# Patient Record
Sex: MALE | Race: WHITE | NOT HISPANIC OR LATINO | Employment: OTHER | URBAN - METROPOLITAN AREA
[De-identification: names, ages, dates, MRNs, and addresses within clinical notes are randomized per-mention and may not be internally consistent; named-entity substitution may affect disease eponyms.]

---

## 2023-03-07 LAB
ALANINE AMINOTRANSFERASE (SGPT) (U/L) IN SER/PLAS: 49 U/L (ref 10–52)
ALBUMIN (G/DL) IN SER/PLAS: 4.7 G/DL (ref 3.4–5)
ALKALINE PHOSPHATASE (U/L) IN SER/PLAS: 64 U/L (ref 33–136)
ANION GAP IN SER/PLAS: 11 MMOL/L (ref 10–20)
ASPARTATE AMINOTRANSFERASE (SGOT) (U/L) IN SER/PLAS: 50 U/L (ref 9–39)
BASOPHILS (10*3/UL) IN BLOOD BY AUTOMATED COUNT: 0.04 X10E9/L (ref 0–0.1)
BASOPHILS/100 LEUKOCYTES IN BLOOD BY AUTOMATED COUNT: 0.6 % (ref 0–2)
BILIRUBIN TOTAL (MG/DL) IN SER/PLAS: 0.9 MG/DL (ref 0–1.2)
CALCIUM (MG/DL) IN SER/PLAS: 9.4 MG/DL (ref 8.6–10.3)
CARBON DIOXIDE, TOTAL (MMOL/L) IN SER/PLAS: 30 MMOL/L (ref 21–32)
CHLORIDE (MMOL/L) IN SER/PLAS: 104 MMOL/L (ref 98–107)
CHOLESTEROL (MG/DL) IN SER/PLAS: 182 MG/DL (ref 0–199)
CHOLESTEROL IN HDL (MG/DL) IN SER/PLAS: 54.9 MG/DL
CHOLESTEROL/HDL RATIO: 3.3
COBALAMIN (VITAMIN B12) (PG/ML) IN SER/PLAS: 289 PG/ML (ref 211–911)
CREATININE (MG/DL) IN SER/PLAS: 1.01 MG/DL (ref 0.5–1.3)
EOSINOPHILS (10*3/UL) IN BLOOD BY AUTOMATED COUNT: 0.1 X10E9/L (ref 0–0.7)
EOSINOPHILS/100 LEUKOCYTES IN BLOOD BY AUTOMATED COUNT: 1.5 % (ref 0–6)
ERYTHROCYTE DISTRIBUTION WIDTH (RATIO) BY AUTOMATED COUNT: 12.7 % (ref 11.5–14.5)
ERYTHROCYTE MEAN CORPUSCULAR HEMOGLOBIN CONCENTRATION (G/DL) BY AUTOMATED: 34.8 G/DL (ref 32–36)
ERYTHROCYTE MEAN CORPUSCULAR VOLUME (FL) BY AUTOMATED COUNT: 100 FL (ref 80–100)
ERYTHROCYTES (10*6/UL) IN BLOOD BY AUTOMATED COUNT: 4.31 X10E12/L (ref 4.5–5.9)
FOLATE (NG/ML) IN SER/PLAS: 9.4 NG/ML
GFR MALE: 83 ML/MIN/1.73M2
GLUCOSE (MG/DL) IN SER/PLAS: 100 MG/DL (ref 74–99)
HEMATOCRIT (%) IN BLOOD BY AUTOMATED COUNT: 43.1 % (ref 41–52)
HEMOGLOBIN (G/DL) IN BLOOD: 15 G/DL (ref 13.5–17.5)
IMMATURE GRANULOCYTES/100 LEUKOCYTES IN BLOOD BY AUTOMATED COUNT: 0.6 % (ref 0–0.9)
LDL: 110 MG/DL (ref 0–99)
LEUKOCYTES (10*3/UL) IN BLOOD BY AUTOMATED COUNT: 6.6 X10E9/L (ref 4.4–11.3)
LYMPHOCYTES (10*3/UL) IN BLOOD BY AUTOMATED COUNT: 1.91 X10E9/L (ref 1.2–4.8)
LYMPHOCYTES/100 LEUKOCYTES IN BLOOD BY AUTOMATED COUNT: 29 % (ref 13–44)
MONOCYTES (10*3/UL) IN BLOOD BY AUTOMATED COUNT: 0.73 X10E9/L (ref 0.1–1)
MONOCYTES/100 LEUKOCYTES IN BLOOD BY AUTOMATED COUNT: 11.1 % (ref 2–10)
NEUTROPHILS (10*3/UL) IN BLOOD BY AUTOMATED COUNT: 3.76 X10E9/L (ref 1.2–7.7)
NEUTROPHILS/100 LEUKOCYTES IN BLOOD BY AUTOMATED COUNT: 57.2 % (ref 40–80)
NRBC (PER 100 WBCS) BY AUTOMATED COUNT: 0 /100 WBC (ref 0–0)
PLATELETS (10*3/UL) IN BLOOD AUTOMATED COUNT: 137 X10E9/L (ref 150–450)
POTASSIUM (MMOL/L) IN SER/PLAS: 4.5 MMOL/L (ref 3.5–5.3)
PROTEIN TOTAL: 6.9 G/DL (ref 6.4–8.2)
SODIUM (MMOL/L) IN SER/PLAS: 140 MMOL/L (ref 136–145)
TRIGLYCERIDE (MG/DL) IN SER/PLAS: 87 MG/DL (ref 0–149)
UREA NITROGEN (MG/DL) IN SER/PLAS: 20 MG/DL (ref 6–23)
VLDL: 17 MG/DL (ref 0–40)

## 2023-07-17 ENCOUNTER — HOSPITAL ENCOUNTER (OUTPATIENT)
Dept: DATA CONVERSION | Facility: HOSPITAL | Age: 63
End: 2023-07-17
Attending: STUDENT IN AN ORGANIZED HEALTH CARE EDUCATION/TRAINING PROGRAM | Admitting: STUDENT IN AN ORGANIZED HEALTH CARE EDUCATION/TRAINING PROGRAM
Payer: MEDICARE

## 2023-07-17 DIAGNOSIS — J90 PLEURAL EFFUSION, NOT ELSEWHERE CLASSIFIED: ICD-10-CM

## 2023-08-14 ENCOUNTER — HOSPITAL ENCOUNTER (OUTPATIENT)
Dept: DATA CONVERSION | Facility: HOSPITAL | Age: 63
End: 2023-08-14
Attending: RADIOLOGY
Payer: MEDICARE

## 2023-08-14 DIAGNOSIS — J90 PLEURAL EFFUSION, NOT ELSEWHERE CLASSIFIED: ICD-10-CM

## 2023-08-14 LAB
BASOPHILS/100 LEUKOCYTES IN BODY FLUID BY MANUAL COUNT: 0 %
BLASTS IN FLUID: 0 %
CELLS COUNTED TOTAL (#) IN BODY FLUID: 100
CLARITY FLUID: NORMAL
CLARITY FLUID: NORMAL
COLOR OF BODY FLUID: NORMAL
COLOR OF BODY FLUID: NORMAL
EOSINOPHILS/100 LEUKOCYTES IN BODY FLUID BY MANUAL COUNT: 1 %
ERYTHROCYTES (/UL) IN BODY FLUID: NORMAL
ERYTHROCYTES (/UL) IN BODY FLUID: NORMAL /UL
GLUCOSE (MG/DL) IN BODY FLUID: 106 MG/DL
IMMATURE GRANULOCYTES IN FLUID: 0 %
LACTATE DEHYDROGENASE (U/L) IN BODY FLUID BY LAC->PYR: 83 U/L
LEUKOCYTES (/UL) IN BODY FLUID: 1126 /UL
LEUKOCYTES (/UL) IN BODY FLUID: NORMAL
LYMPHOCYTES/100 LEUKOCYTES IN BODY FLUID BY MAN CT: 70 %
MONOCYTES+MACROPHAGES/100 WBC IN BODY FLUID BY MAN CT: 2 %
NEUTROPHILS/100 LEUKOCYTES IN BODY FLUID BY MANUAL COUNT: 27 %
PLASMA CELLS IN FLUID: 0 %
PROTEIN (G/DL) IN BODY FLUID: 4.6 G/DL
UNCLASSIFIED CELLS IN FLUID: 0 %

## 2023-08-15 LAB
COMPLETE PATHOLOGY REPORT: NORMAL
CONVERTED CLINICAL DIAGNOSIS-HISTORY: NORMAL
CONVERTED DIAGNOSIS COMMENT: NORMAL
CONVERTED FINAL DIAGNOSIS: NORMAL
CONVERTED FINAL REPORT PDF LINK TO COPY AND PASTE: NORMAL
CONVERTED SPECIMEN DESCRIPTION: NORMAL

## 2023-08-17 LAB
GRAM STAIN: NORMAL
STERILE FLUID CULTURE/SMEAR: NORMAL

## 2023-09-29 VITALS — BODY MASS INDEX: 28.07 KG/M2 | WEIGHT: 185.19 LBS | HEIGHT: 68 IN

## 2023-09-29 VITALS — HEIGHT: 68 IN | BODY MASS INDEX: 28.07 KG/M2 | WEIGHT: 185.19 LBS

## 2023-11-06 ENCOUNTER — TELEPHONE (OUTPATIENT)
Dept: CARDIOLOGY | Facility: CLINIC | Age: 63
End: 2023-11-06
Payer: MEDICARE

## 2023-11-06 PROBLEM — E78.2 MIXED HYPERLIPIDEMIA: Status: ACTIVE | Noted: 2023-11-06

## 2023-11-06 PROBLEM — I42.9 CARDIOMYOPATHY (MULTI): Status: ACTIVE | Noted: 2023-11-06

## 2023-11-06 PROBLEM — R79.89 ELEVATED LFTS: Status: ACTIVE | Noted: 2023-11-06

## 2023-11-06 PROBLEM — I10 ESSENTIAL HYPERTENSION, BENIGN: Status: ACTIVE | Noted: 2023-11-06

## 2023-11-06 PROBLEM — D75.89 MACROCYTOSIS: Status: ACTIVE | Noted: 2023-11-06

## 2023-11-06 RX ORDER — LOSARTAN POTASSIUM 25 MG/1
50 TABLET ORAL DAILY
COMMUNITY
End: 2023-11-30 | Stop reason: DRUGHIGH

## 2023-11-06 RX ORDER — METOPROLOL SUCCINATE 50 MG/1
50 TABLET, EXTENDED RELEASE ORAL DAILY
COMMUNITY
End: 2023-11-30

## 2023-11-06 RX ORDER — ROSUVASTATIN CALCIUM 20 MG/1
20 TABLET, COATED ORAL
COMMUNITY
End: 2023-12-10 | Stop reason: SDUPTHER

## 2023-11-07 NOTE — TELEPHONE ENCOUNTER
Patient returned my call.  Patient indicated that the pulmonologist cleared patient to resume Xarelto approximately 9/21/2023.  Patient has been taking the medication since that time.    Patient advised that he picked a prescription up for a 90 day supply of Xarelto, 20 mg tablet, 1x per day, on 11/3/2023, and does not need medication refilled at this time.

## 2023-11-29 ENCOUNTER — TELEPHONE (OUTPATIENT)
Dept: CARDIOLOGY | Facility: CLINIC | Age: 63
End: 2023-11-29
Payer: MEDICARE

## 2023-11-29 DIAGNOSIS — E78.2 MIXED HYPERLIPIDEMIA: ICD-10-CM

## 2023-11-29 DIAGNOSIS — I48.91 UNSPECIFIED ATRIAL FIBRILLATION (MULTI): ICD-10-CM

## 2023-11-29 NOTE — TELEPHONE ENCOUNTER
Patient called requesting med refills of Metoprolol 50MG and rosuvastatin 20mg to [facility].     In review of chart, refills should be on file at the pharmacy.  I called pharmacy and confirmed.  Patient is aware.  He will contact pharmacy and request the refill.

## 2023-11-30 DIAGNOSIS — I10 ESSENTIAL (PRIMARY) HYPERTENSION: ICD-10-CM

## 2023-11-30 RX ORDER — METOPROLOL SUCCINATE 50 MG/1
50 TABLET, EXTENDED RELEASE ORAL DAILY
Qty: 90 TABLET | Refills: 0 | Status: SHIPPED | OUTPATIENT
Start: 2023-11-30 | End: 2024-02-27

## 2023-11-30 RX ORDER — LOSARTAN POTASSIUM 50 MG/1
50 TABLET ORAL DAILY
Qty: 90 TABLET | Refills: 0 | Status: SHIPPED | OUTPATIENT
Start: 2023-11-30 | End: 2024-01-31 | Stop reason: SDUPTHER

## 2023-12-01 RX ORDER — ROSUVASTATIN CALCIUM 20 MG/1
20 TABLET, COATED ORAL DAILY
Qty: 90 TABLET | Refills: 3 | OUTPATIENT
Start: 2023-12-01

## 2023-12-07 DIAGNOSIS — E78.2 MIXED HYPERLIPIDEMIA: Primary | ICD-10-CM

## 2023-12-10 RX ORDER — ROSUVASTATIN CALCIUM 20 MG/1
20 TABLET, COATED ORAL DAILY
Qty: 90 TABLET | Refills: 3 | Status: SHIPPED | OUTPATIENT
Start: 2023-12-10 | End: 2024-05-01 | Stop reason: SDUPTHER

## 2024-01-22 ENCOUNTER — APPOINTMENT (OUTPATIENT)
Dept: PRIMARY CARE | Facility: CLINIC | Age: 64
End: 2024-01-22
Payer: MEDICARE

## 2024-01-24 PROBLEM — R60.0 EDEMA LEG: Status: ACTIVE | Noted: 2024-01-24

## 2024-01-24 PROBLEM — S22.42XA CLOSED FRACTURE OF MULTIPLE RIBS OF LEFT SIDE: Status: ACTIVE | Noted: 2024-01-24

## 2024-01-24 PROBLEM — S20.212S: Status: ACTIVE | Noted: 2024-01-24

## 2024-01-24 PROBLEM — R79.89 TSH ELEVATION: Status: ACTIVE | Noted: 2024-01-24
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
## 2024-01-24 PROBLEM — J90 PLEURAL EFFUSION, LEFT: Status: ACTIVE | Noted: 2024-01-24

## 2024-01-24 PROBLEM — M25.512 CHRONIC LEFT SHOULDER PAIN: Status: ACTIVE | Noted: 2024-01-24

## 2024-01-24 PROBLEM — R06.02 MILD SHORTNESS OF BREATH: Status: ACTIVE | Noted: 2024-01-24

## 2024-01-24 PROBLEM — E66.3 OVERWEIGHT (BMI 25.0-29.9): Status: ACTIVE | Noted: 2024-01-24

## 2024-01-24 PROBLEM — G89.29 CHRONIC LEFT SHOULDER PAIN: Status: ACTIVE | Noted: 2024-01-24

## 2024-01-24 PROBLEM — I48.91 ATRIAL FIBRILLATION (MULTI): Status: ACTIVE | Noted: 2024-01-24

## 2024-01-24 RX ORDER — LORATADINE 10 MG/1
10 TABLET ORAL DAILY
COMMUNITY

## 2024-01-31 ENCOUNTER — OFFICE VISIT (OUTPATIENT)
Dept: PRIMARY CARE | Facility: CLINIC | Age: 64
End: 2024-01-31
Payer: MEDICARE

## 2024-01-31 VITALS
DIASTOLIC BLOOD PRESSURE: 77 MMHG | HEART RATE: 50 BPM | BODY MASS INDEX: 30.5 KG/M2 | SYSTOLIC BLOOD PRESSURE: 145 MMHG | HEIGHT: 66 IN | WEIGHT: 189.8 LBS | TEMPERATURE: 97.4 F

## 2024-01-31 DIAGNOSIS — I48.0 PAROXYSMAL ATRIAL FIBRILLATION (MULTI): ICD-10-CM

## 2024-01-31 DIAGNOSIS — E78.2 MIXED HYPERLIPIDEMIA: ICD-10-CM

## 2024-01-31 DIAGNOSIS — M70.61 TROCHANTERIC BURSITIS OF RIGHT HIP: ICD-10-CM

## 2024-01-31 DIAGNOSIS — Z12.5 ENCOUNTER FOR SCREENING FOR MALIGNANT NEOPLASM OF PROSTATE: ICD-10-CM

## 2024-01-31 DIAGNOSIS — H00.15 CHALAZION OF LEFT LOWER EYELID: ICD-10-CM

## 2024-01-31 DIAGNOSIS — I10 ESSENTIAL (PRIMARY) HYPERTENSION: Primary | ICD-10-CM

## 2024-01-31 PROCEDURE — 3077F SYST BP >= 140 MM HG: CPT | Performed by: FAMILY MEDICINE

## 2024-01-31 PROCEDURE — 1160F RVW MEDS BY RX/DR IN RCRD: CPT | Performed by: FAMILY MEDICINE

## 2024-01-31 PROCEDURE — 1159F MED LIST DOCD IN RCRD: CPT | Performed by: FAMILY MEDICINE

## 2024-01-31 PROCEDURE — 99214 OFFICE O/P EST MOD 30 MIN: CPT | Performed by: FAMILY MEDICINE

## 2024-01-31 PROCEDURE — 3008F BODY MASS INDEX DOCD: CPT | Performed by: FAMILY MEDICINE

## 2024-01-31 PROCEDURE — 3078F DIAST BP <80 MM HG: CPT | Performed by: FAMILY MEDICINE

## 2024-01-31 PROCEDURE — 1036F TOBACCO NON-USER: CPT | Performed by: FAMILY MEDICINE

## 2024-01-31 PROCEDURE — 1126F AMNT PAIN NOTED NONE PRSNT: CPT | Performed by: FAMILY MEDICINE

## 2024-01-31 RX ORDER — BACITRACIN ZINC AND POLYMYXIN B SULFATE 500; 10000 [USP'U]/G; [USP'U]/G
OINTMENT OPHTHALMIC EVERY 12 HOURS
Qty: 3.5 G | Refills: 0 | Status: SHIPPED | OUTPATIENT
Start: 2024-01-31 | End: 2024-02-10

## 2024-01-31 RX ORDER — RIVAROXABAN 20 MG/1
20 TABLET, FILM COATED ORAL DAILY
COMMUNITY
End: 2024-05-01 | Stop reason: SDUPTHER

## 2024-01-31 RX ORDER — LOSARTAN POTASSIUM 100 MG/1
100 TABLET ORAL DAILY
Qty: 90 TABLET | Refills: 1 | Status: SHIPPED | OUTPATIENT
Start: 2024-01-31 | End: 2024-02-07 | Stop reason: SDUPTHER

## 2024-01-31 RX ORDER — CHOLECALCIFEROL (VITAMIN D3) 25 MCG
1 TABLET,CHEWABLE ORAL DAILY
COMMUNITY
Start: 2022-08-25

## 2024-01-31 ASSESSMENT — PATIENT HEALTH QUESTIONNAIRE - PHQ9
SUM OF ALL RESPONSES TO PHQ9 QUESTIONS 1 AND 2: 0
1. LITTLE INTEREST OR PLEASURE IN DOING THINGS: NOT AT ALL
2. FEELING DOWN, DEPRESSED OR HOPELESS: NOT AT ALL

## 2024-01-31 NOTE — PATIENT INSTRUCTIONS

## 2024-01-31 NOTE — PROGRESS NOTES
Subjective   Patient ID: Norris Espinal is a 63 y.o. male who presents for Follow-up (Medication follow up).  HPI  HTN-tolerating losartan and metoprolol without adverse effect.  Not checking BP at home.  Denies headaches, lightheadedness, dizziness.  Afib-anticoagulated with Xarelto.  Rate control with beta-blocker.  Denies any symptoms of rapid heart rate.  No increased fatigue.  R hip pain-pain with direct pressure when laying on right side.  Subsequent R knee ache.  No injury.  Irritation left eye-red bump along left lower eyelid.  Present several weeks ago, then resolved.  Reoccurred earlier this week.  Irritated, tender.  No drainage.  Not associate with blurred or double vision.  Review of Systems  Per HPI  Objective   Physical Exam  Vitals reviewed.   Constitutional:       General: He is not in acute distress.     Appearance: Normal appearance.   HENT:      Head: Normocephalic and atraumatic.      Right Ear: Tympanic membrane and ear canal normal.      Left Ear: Tympanic membrane and ear canal normal.      Nose: Nose normal. No congestion or rhinorrhea.      Mouth/Throat:      Mouth: Mucous membranes are moist.      Pharynx: Oropharynx is clear.   Eyes:      Extraocular Movements: Extraocular movements intact.      Conjunctiva/sclera: Conjunctivae normal.      Pupils: Pupils are equal, round, and reactive to light.        Comments: Erythematous papular lesion along lateral aspect of left lower eyelid-appears consistent with chalazion.   Cardiovascular:      Rate and Rhythm: Normal rate. Rhythm irregular.      Pulses: Normal pulses.      Heart sounds: Normal heart sounds. No murmur heard.  Pulmonary:      Effort: Pulmonary effort is normal. No respiratory distress.      Breath sounds: Normal breath sounds.   Abdominal:      General: Abdomen is flat. Bowel sounds are normal.      Palpations: Abdomen is soft.      Tenderness: There is no abdominal tenderness.   Musculoskeletal:         General: Normal range of  motion.      Cervical back: Normal range of motion and neck supple.      Right hip: Tenderness present. Normal range of motion.      Right knee: Normal. No bony tenderness or crepitus. Normal range of motion.      Left knee: Normal.      Comments: Pain with palpation over the right trochanteric bursa   Lymphadenopathy:      Cervical: No cervical adenopathy.   Skin:     General: Skin is warm and dry.   Neurological:      General: No focal deficit present.      Mental Status: He is alert and oriented to person, place, and time.   Psychiatric:         Mood and Affect: Mood normal.         Thought Content: Thought content normal.         Assessment/Plan   Diagnoses and all orders for this visit:  Essential (primary) hypertension  BP not optimally controlled.  Increase losartan to 100 mg daily.  Lab work as ordered prior to follow-up  -     Albumin , Urine Random; Future  -     Comprehensive Metabolic Panel; Future  -     losartan (Cozaar) 100 mg tablet; Take 1 tablet (100 mg) by mouth once daily.  Paroxysmal atrial fibrillation (CMS/HCC)  Patient has very short neck and persistent atrial fibrillation.  Check for underlying sleep apnea as etiology or exacerbating factor to his atrial fibrillation.  -     CBC and Auto Differential; Future  -     Comprehensive Metabolic Panel; Future  -     TSH with reflex to Free T4 if abnormal; Future  -     Home sleep apnea test (HSAT); Future  Encounter for screening for malignant neoplasm of prostate  -     Prostate Specific Antigen; Future  Mixed hyperlipidemia  -     Comprehensive Metabolic Panel; Future  -     Lipid Panel; Future  -     TSH with reflex to Free T4 if abnormal; Future  Chalazion of left lower eyelid  Topical antibiotic ointment to left lower lid chalazion, recommend warm compresses, cleanse eye lashes and lid several times frequent baby shampoo.  If ongoing symptoms, will refer to ophthalmology  -     bacitracin-polymyxin B (Polysporin) ophthalmic ointment; Apply to  left eye every 12 hours for 10 days.  Trochanteric bursitis of right hip-discussed intrabursal corticosteroid injection.  Patient declined at present.  Use ice when painful.  Consider PT if ongoing symptoms  Other orders  -     Follow Up In Primary Care - Established; Future  -     Follow Up In Primary Care - Medicare Annual; Future           Court Diallo MD 01/31/24 1:38 PM Patient was identified as a fall risk. Risk prevention instructions provided.

## 2024-02-07 ENCOUNTER — TELEPHONE (OUTPATIENT)
Dept: PRIMARY CARE | Facility: CLINIC | Age: 64
End: 2024-02-07
Payer: MEDICARE

## 2024-02-07 DIAGNOSIS — I10 ESSENTIAL (PRIMARY) HYPERTENSION: ICD-10-CM

## 2024-02-07 RX ORDER — LOSARTAN POTASSIUM 100 MG/1
100 TABLET ORAL DAILY
Qty: 90 TABLET | Refills: 1 | Status: SHIPPED | OUTPATIENT
Start: 2024-02-07

## 2024-02-07 NOTE — TELEPHONE ENCOUNTER
Patient left voicemail stating that his Losartan needed to be sent to [facility] in [place].  States that it went to the mail order.  Please resend to retail pharmacy.

## 2024-02-21 ENCOUNTER — OFFICE VISIT (OUTPATIENT)
Dept: SURGERY | Facility: HOSPITAL | Age: 64
End: 2024-02-21
Payer: MEDICARE

## 2024-02-21 ENCOUNTER — HOSPITAL ENCOUNTER (OUTPATIENT)
Dept: RADIOLOGY | Facility: HOSPITAL | Age: 64
Discharge: HOME | End: 2024-02-21
Payer: MEDICARE

## 2024-02-21 VITALS
TEMPERATURE: 97.3 F | HEART RATE: 105 BPM | OXYGEN SATURATION: 96 % | DIASTOLIC BLOOD PRESSURE: 93 MMHG | BODY MASS INDEX: 30.87 KG/M2 | WEIGHT: 192.1 LBS | SYSTOLIC BLOOD PRESSURE: 142 MMHG | HEIGHT: 66 IN | RESPIRATION RATE: 16 BRPM

## 2024-02-21 DIAGNOSIS — J90 PLEURAL EFFUSION: ICD-10-CM

## 2024-02-21 DIAGNOSIS — J90 PLEURAL EFFUSION: Primary | ICD-10-CM

## 2024-02-21 PROCEDURE — 3077F SYST BP >= 140 MM HG: CPT | Performed by: THORACIC SURGERY (CARDIOTHORACIC VASCULAR SURGERY)

## 2024-02-21 PROCEDURE — 3008F BODY MASS INDEX DOCD: CPT | Performed by: THORACIC SURGERY (CARDIOTHORACIC VASCULAR SURGERY)

## 2024-02-21 PROCEDURE — 71046 X-RAY EXAM CHEST 2 VIEWS: CPT

## 2024-02-21 PROCEDURE — 1160F RVW MEDS BY RX/DR IN RCRD: CPT | Performed by: THORACIC SURGERY (CARDIOTHORACIC VASCULAR SURGERY)

## 2024-02-21 PROCEDURE — 1157F ADVNC CARE PLAN IN RCRD: CPT | Performed by: THORACIC SURGERY (CARDIOTHORACIC VASCULAR SURGERY)

## 2024-02-21 PROCEDURE — 1036F TOBACCO NON-USER: CPT | Performed by: THORACIC SURGERY (CARDIOTHORACIC VASCULAR SURGERY)

## 2024-02-21 PROCEDURE — 99213 OFFICE O/P EST LOW 20 MIN: CPT | Performed by: THORACIC SURGERY (CARDIOTHORACIC VASCULAR SURGERY)

## 2024-02-21 PROCEDURE — 3080F DIAST BP >= 90 MM HG: CPT | Performed by: THORACIC SURGERY (CARDIOTHORACIC VASCULAR SURGERY)

## 2024-02-21 PROCEDURE — 1159F MED LIST DOCD IN RCRD: CPT | Performed by: THORACIC SURGERY (CARDIOTHORACIC VASCULAR SURGERY)

## 2024-02-21 PROCEDURE — 1126F AMNT PAIN NOTED NONE PRSNT: CPT | Performed by: THORACIC SURGERY (CARDIOTHORACIC VASCULAR SURGERY)

## 2024-02-21 ASSESSMENT — COLUMBIA-SUICIDE SEVERITY RATING SCALE - C-SSRS
2. HAVE YOU ACTUALLY HAD ANY THOUGHTS OF KILLING YOURSELF?: NO
1. IN THE PAST MONTH, HAVE YOU WISHED YOU WERE DEAD OR WISHED YOU COULD GO TO SLEEP AND NOT WAKE UP?: NO
6. HAVE YOU EVER DONE ANYTHING, STARTED TO DO ANYTHING, OR PREPARED TO DO ANYTHING TO END YOUR LIFE?: NO

## 2024-02-21 ASSESSMENT — ENCOUNTER SYMPTOMS
DEPRESSION: 0
OCCASIONAL FEELINGS OF UNSTEADINESS: 0
LOSS OF SENSATION IN FEET: 0

## 2024-02-21 ASSESSMENT — PATIENT HEALTH QUESTIONNAIRE - PHQ9
1. LITTLE INTEREST OR PLEASURE IN DOING THINGS: NOT AT ALL
SUM OF ALL RESPONSES TO PHQ9 QUESTIONS 1 AND 2: 0
2. FEELING DOWN, DEPRESSED OR HOPELESS: NOT AT ALL

## 2024-02-21 ASSESSMENT — PAIN SCALES - GENERAL: PAINLEVEL: 0-NO PAIN

## 2024-02-21 NOTE — PROGRESS NOTES
Mr. Espinal presented with a recurrent exudative left effusion. On 9/7/23 he underwent left thoracoscopy and partial decortication.  On his last x-ray there still remained moderate moderate inflammation and fluid in the left side and he is here for follow-up.  Since the last visit there have been no changes to the past medical history, past surgical history, social history, family history, or review of systems.    I looked his x-ray from today and is largely clear with near complete resolution of the left pleural abnormality.  His final cultures are negative    Mr. Rodrigues is doing well.  He is a never smoker and does not need any further imaging

## 2024-02-27 DIAGNOSIS — I48.91 UNSPECIFIED ATRIAL FIBRILLATION (MULTI): ICD-10-CM

## 2024-02-27 DIAGNOSIS — J90 PLEURAL EFFUSION: Primary | ICD-10-CM

## 2024-02-27 RX ORDER — METOPROLOL SUCCINATE 50 MG/1
50 TABLET, EXTENDED RELEASE ORAL DAILY
Qty: 90 TABLET | Refills: 0 | Status: SHIPPED | OUTPATIENT
Start: 2024-02-27 | End: 2024-05-01 | Stop reason: SDUPTHER

## 2024-03-21 ENCOUNTER — HOSPITAL ENCOUNTER (OUTPATIENT)
Dept: RADIOLOGY | Facility: HOSPITAL | Age: 64
Discharge: HOME | End: 2024-03-21
Payer: MEDICARE

## 2024-03-21 DIAGNOSIS — J90 PLEURAL EFFUSION: ICD-10-CM

## 2024-03-21 PROCEDURE — 71250 CT THORAX DX C-: CPT

## 2024-03-21 PROCEDURE — 71250 CT THORAX DX C-: CPT | Performed by: RADIOLOGY

## 2024-03-26 ENCOUNTER — TELEPHONE (OUTPATIENT)
Dept: CARDIOTHORACIC SURGERY | Facility: HOSPITAL | Age: 64
End: 2024-03-26
Payer: MEDICARE

## 2024-03-26 NOTE — TELEPHONE ENCOUNTER
I spoke with Mr. Espinal regarding the results of his CT scan.  Dr. Zelaya reviewed his recent CT and everything looks fine.  He does not need further imaging or follow up with us.  He verbalized understanding.  Mariaelena Barlow RN

## 2024-03-28 ENCOUNTER — OFFICE VISIT (OUTPATIENT)
Dept: CARDIOLOGY | Facility: CLINIC | Age: 64
End: 2024-03-28
Payer: MEDICARE

## 2024-03-28 VITALS
WEIGHT: 192 LBS | SYSTOLIC BLOOD PRESSURE: 136 MMHG | DIASTOLIC BLOOD PRESSURE: 72 MMHG | HEIGHT: 66 IN | HEART RATE: 55 BPM | BODY MASS INDEX: 30.86 KG/M2

## 2024-03-28 DIAGNOSIS — Z78.9 NEVER SMOKED TOBACCO: ICD-10-CM

## 2024-03-28 DIAGNOSIS — E78.2 MIXED HYPERLIPIDEMIA: ICD-10-CM

## 2024-03-28 DIAGNOSIS — I10 ESSENTIAL HYPERTENSION, BENIGN: ICD-10-CM

## 2024-03-28 DIAGNOSIS — I48.91 ATRIAL FIBRILLATION, UNSPECIFIED TYPE (MULTI): ICD-10-CM

## 2024-03-28 PROCEDURE — 3078F DIAST BP <80 MM HG: CPT | Performed by: INTERNAL MEDICINE

## 2024-03-28 PROCEDURE — 99214 OFFICE O/P EST MOD 30 MIN: CPT | Performed by: INTERNAL MEDICINE

## 2024-03-28 PROCEDURE — 1157F ADVNC CARE PLAN IN RCRD: CPT | Performed by: INTERNAL MEDICINE

## 2024-03-28 PROCEDURE — 1036F TOBACCO NON-USER: CPT | Performed by: INTERNAL MEDICINE

## 2024-03-28 PROCEDURE — 3008F BODY MASS INDEX DOCD: CPT | Performed by: INTERNAL MEDICINE

## 2024-03-28 PROCEDURE — 1159F MED LIST DOCD IN RCRD: CPT | Performed by: INTERNAL MEDICINE

## 2024-03-28 PROCEDURE — 1160F RVW MEDS BY RX/DR IN RCRD: CPT | Performed by: INTERNAL MEDICINE

## 2024-03-28 PROCEDURE — 3075F SYST BP GE 130 - 139MM HG: CPT | Performed by: INTERNAL MEDICINE

## 2024-03-28 ASSESSMENT — ENCOUNTER SYMPTOMS
NEUROLOGICAL NEGATIVE: 1
CONSTITUTIONAL NEGATIVE: 1
RESPIRATORY NEGATIVE: 1
CARDIOVASCULAR NEGATIVE: 1

## 2024-03-28 ASSESSMENT — PATIENT HEALTH QUESTIONNAIRE - PHQ9
1. LITTLE INTEREST OR PLEASURE IN DOING THINGS: NOT AT ALL
2. FEELING DOWN, DEPRESSED OR HOPELESS: NOT AT ALL
SUM OF ALL RESPONSES TO PHQ9 QUESTIONS 1 AND 2: 0

## 2024-03-28 NOTE — PROGRESS NOTES
CARDIOLOGY OFFICE VISIT      CHIEF COMPLAINT  Chief Complaint   Patient presents with    Follow-up        HISTORY OF PRESENT ILLNESS  Norris Espinal is a 63 y.o. year old male patient With a history of paroxysmal atrial fibrillation was continued to maintain sinus rhythm.  He states his smart watch does report some occasional irregular heart rates sometimes with heart rate up to 140s while he remains asymptomatic.  He also reported sometimes heart rates as low as high 30s.  This usually occurs when he is sleeping.  Echo shows mild LV dysfunction with EF of 45%.  Cardiac catheterization in 2022 showed normal coronaries.  He had recent left-sided pleural effusion for which she had a decortication with no further recurrences.  He denies chest pain or significant shortness of breath.    ASSESSMENT AND PLAN:  1.  Paroxysmal atrial fibrillation: He is maintaining sinus rhythm on his current medications.  He does appear to have some relative sinus bradycardia so we will get a 48-hour Holter monitor for further evaluation to determine if we need to cut back on his beta-blockers.  We will continue with Xarelto for long-term anticoagulation.  2.  Hypertension: Blood pressure is well-controlled on current medications which we will continue.  3.  Nonischemic cardiomyopathy with stable LV dysfunction which is mild and he continues to remain asymptomatic.      Problem List Items Addressed This Visit       Essential hypertension, benign    Mixed hyperlipidemia    Atrial fibrillation (CMS/HCC)    BMI 31.0-31.9,adult     Other Visit Diagnoses       Never smoked tobacco                Recent Cardiovascular Testing:    Echo-08/23/2023 CONCLUSIONS:  1. Poorly visualized anatomical structures due to suboptimal image quality.  2. Left ventricular systolic function was not well visualized.  3. Poorly visualized LV cavity makes it difficult to estimate LVEF. However, it appears to be mildly reduced.     Stress-06/17/2022  IMPRESSION:  Abnormal Lexiscan Myoview cardiac perfusion stress test.  No myocardial ischemia by perfusion imaging.  No myocardial infarction by perfusion imaging.  Abnormal left ventricular systolic function.  Left ventricular ejection fraction 40 %.  There are no prior studies available for comparison  Patient with good of functional capacity at 12.1 Mets.  Cath-07/21/2022 CONCLUSIONS:   1. The entire Left Main: 0% stenosis.   2. Entire LAD Lesion: The percent stenosis is 0%.   3. Entire CX Lesion: The percent stenosis is 0%.   4. The entire RCA Lesion: The percent stenosis is 0%.   5. The Left Ventricular Ejection Fraction is 40%, by echo.     Carotid Ultrasound-    Past Medical History  Past Medical History:   Diagnosis Date    Allergic contact dermatitis due to plants, except food 10/21/2013    Contact dermatitis due to poison ivy    Other conditions influencing health status 04/07/2018    History of cough    Personal history of other diseases of the respiratory system 12/09/2014    History of acute sinusitis    Personal history of other diseases of the respiratory system 04/10/2015    History of acute bronchitis with bronchospasm       Social History  Social History     Tobacco Use    Smoking status: Never     Passive exposure: Past    Smokeless tobacco: Never   Vaping Use    Vaping Use: Never used   Substance Use Topics    Alcohol use: Yes     Alcohol/week: 21.0 standard drinks of alcohol     Types: 21 Standard drinks or equivalent per week    Drug use: Not Currently       Family History     Family History   Problem Relation Name Age of Onset    Colon cancer Mother      Diabetes type I Father      Other (myocardial infarction) Father      Heart attack Father          Allergies:  Allergies   Allergen Reactions    Penicillins Unknown    Sulfamethoxazole Unknown        Outpatient Medications:  Current Outpatient Medications   Medication Instructions    cyanocobalamin, vitamin B-12, 1,000 mcg capsule 1 capsule,  "oral, Daily    loratadine (CLARITIN) 10 mg, oral, Daily    losartan (COZAAR) 100 mg, oral, Daily    metoprolol succinate XL (TOPROL-XL) 50 mg, oral, Daily    rosuvastatin (CRESTOR) 20 mg, oral, Daily    Xarelto 20 mg, oral, Daily, take with evening meal        Recent Lab Results:    CBC:   Lab Results   Component Value Date    WBC 7.2 09/09/2023    RBC 3.89 (L) 09/09/2023    HGB 13.5 09/09/2023    HCT 39.8 (L) 09/09/2023     (L) 09/09/2023        CMP:    Lab Results   Component Value Date     09/09/2023    K 3.8 09/09/2023     09/09/2023    CO2 25 09/09/2023    BUN 23 09/09/2023    CREATININE 0.86 09/09/2023    GLUCOSE 96 09/09/2023    CALCIUM 9.2 09/09/2023       Magnesium:    Lab Results   Component Value Date    MG 2.12 09/09/2023       Lipid Profile:    Lab Results   Component Value Date    TRIG 87 03/07/2023    HDL 54.9 03/07/2023       TSH:    Lab Results   Component Value Date    TSH 2.59 08/23/2022       BNP:   No results found for: \"BNP\"     PT/INR:    Lab Results   Component Value Date    PROTIME 11.5 09/09/2023    INR 1.0 09/09/2023       HgBA1c:    Lab Results   Component Value Date    HGBA1C 4.7 06/08/2022       BMP:  Lab Results   Component Value Date     09/09/2023     09/08/2023     09/05/2023    K 3.8 09/09/2023    K 4.7 09/08/2023    K 4.2 09/05/2023     09/09/2023    CL 99 09/08/2023     09/05/2023    CO2 25 09/09/2023    CO2 26 09/08/2023    CO2 24 09/05/2023    BUN 23 09/09/2023    BUN 18 09/08/2023    BUN 15 09/05/2023    CREATININE 0.86 09/09/2023    CREATININE 0.97 09/08/2023    CREATININE 0.84 09/05/2023       CBC:  Lab Results   Component Value Date    WBC 7.2 09/09/2023    WBC CANCELED 09/08/2023    WBC 8.2 09/08/2023    RBC 3.89 (L) 09/09/2023    RBC CANCELED 09/08/2023    RBC 4.51 09/08/2023    HGB 13.5 09/09/2023    HGB CANCELED 09/08/2023    HGB 15.3 09/08/2023    HCT 39.8 (L) 09/09/2023    HCT CANCELED 09/08/2023    HCT 44.2 09/08/2023 " "    (H) 09/09/2023    MCV CANCELED 09/08/2023    MCV 98 09/08/2023    MCHC 33.9 09/09/2023    MCHC CANCELED 09/08/2023    MCHC 34.6 09/08/2023    RDW 13.0 09/09/2023    RDW CANCELED 09/08/2023    RDW 12.9 09/08/2023     (L) 09/09/2023    PLT CANCELED 09/08/2023     09/08/2023       Cardiac Enzymes:    No results found for: \"TROPHS\"    Hepatic Function Panel:    Lab Results   Component Value Date    ALKPHOS 64 03/07/2023    ALT 49 03/07/2023    AST 50 (H) 03/07/2023    PROT 6.9 03/07/2023    BILITOT 0.9 03/07/2023         REVIEW OF SYSTEMS  Review of Systems   Constitutional: Negative.   Cardiovascular: Negative.    Respiratory: Negative.     Neurological: Negative.        VITALS  Vitals:    03/28/24 1421   BP: 136/72   Pulse: 55     Wt Readings from Last 4 Encounters:   03/28/24 87.1 kg (192 lb)   02/21/24 87.1 kg (192 lb 1.6 oz)   01/31/24 86.1 kg (189 lb 12.8 oz)   09/15/23 83.7 kg (184 lb 7 oz)       PHYSICAL EXAM  Constitutional:       Appearance: Healthy appearance.   Eyes:      Pupils: Pupils are equal, round, and reactive to light.   Pulmonary:      Effort: Pulmonary effort is normal.   Cardiovascular:      Normal rate. Regular rhythm.      Murmurs: There is no murmur.   Edema:     Peripheral edema absent.   Musculoskeletal:      Cervical back: Normal range of motion. Skin:     General: Skin is warm and dry.   Neurological:      Mental Status: Alert.           Scribe Attestation  By signing my name below, IROSA ELENA LPN , Scribe attest that this documentation has been prepared under the direction and in the presence of Eliu Larios MD.          "

## 2024-04-24 ENCOUNTER — ANCILLARY PROCEDURE (OUTPATIENT)
Dept: CARDIOLOGY | Facility: CLINIC | Age: 64
End: 2024-04-24
Payer: MEDICARE

## 2024-04-24 DIAGNOSIS — I48.91 ATRIAL FIBRILLATION, UNSPECIFIED TYPE (MULTI): ICD-10-CM

## 2024-04-24 PROCEDURE — 93227 XTRNL ECG REC<48 HR R&I: CPT | Performed by: INTERNAL MEDICINE

## 2024-04-24 PROCEDURE — 93225 XTRNL ECG REC<48 HRS REC: CPT | Performed by: INTERNAL MEDICINE

## 2024-04-29 ENCOUNTER — LAB (OUTPATIENT)
Dept: LAB | Facility: LAB | Age: 64
End: 2024-04-29
Payer: MEDICARE

## 2024-04-29 ENCOUNTER — APPOINTMENT (OUTPATIENT)
Dept: PRIMARY CARE | Facility: CLINIC | Age: 64
End: 2024-04-29
Payer: MEDICARE

## 2024-04-29 DIAGNOSIS — Z12.5 ENCOUNTER FOR SCREENING FOR MALIGNANT NEOPLASM OF PROSTATE: ICD-10-CM

## 2024-04-29 DIAGNOSIS — I48.0 PAROXYSMAL ATRIAL FIBRILLATION (MULTI): ICD-10-CM

## 2024-04-29 DIAGNOSIS — E78.2 MIXED HYPERLIPIDEMIA: ICD-10-CM

## 2024-04-29 DIAGNOSIS — I10 ESSENTIAL (PRIMARY) HYPERTENSION: ICD-10-CM

## 2024-04-29 LAB
ALBUMIN SERPL BCP-MCNC: 4.9 G/DL (ref 3.4–5)
ALP SERPL-CCNC: 57 U/L (ref 33–136)
ALT SERPL W P-5'-P-CCNC: 45 U/L (ref 10–52)
ANION GAP SERPL CALC-SCNC: 12 MMOL/L (ref 10–20)
AST SERPL W P-5'-P-CCNC: 41 U/L (ref 9–39)
BASOPHILS # BLD AUTO: 0.03 X10*3/UL (ref 0–0.1)
BASOPHILS NFR BLD AUTO: 0.5 %
BILIRUB SERPL-MCNC: 0.7 MG/DL (ref 0–1.2)
BUN SERPL-MCNC: 20 MG/DL (ref 6–23)
CALCIUM SERPL-MCNC: 9.3 MG/DL (ref 8.6–10.3)
CHLORIDE SERPL-SCNC: 106 MMOL/L (ref 98–107)
CHOLEST SERPL-MCNC: 211 MG/DL (ref 0–199)
CHOLESTEROL/HDL RATIO: 3.5
CO2 SERPL-SCNC: 27 MMOL/L (ref 21–32)
CREAT SERPL-MCNC: 0.99 MG/DL (ref 0.5–1.3)
CREAT UR-MCNC: 225.3 MG/DL (ref 20–370)
EGFRCR SERPLBLD CKD-EPI 2021: 85 ML/MIN/1.73M*2
EOSINOPHIL # BLD AUTO: 0.16 X10*3/UL (ref 0–0.7)
EOSINOPHIL NFR BLD AUTO: 2.7 %
ERYTHROCYTE [DISTWIDTH] IN BLOOD BY AUTOMATED COUNT: 12.7 % (ref 11.5–14.5)
GLUCOSE SERPL-MCNC: 93 MG/DL (ref 74–99)
HCT VFR BLD AUTO: 41.9 % (ref 41–52)
HDLC SERPL-MCNC: 60 MG/DL
HGB BLD-MCNC: 14.9 G/DL (ref 13.5–17.5)
IMM GRANULOCYTES # BLD AUTO: 0.03 X10*3/UL (ref 0–0.7)
IMM GRANULOCYTES NFR BLD AUTO: 0.5 % (ref 0–0.9)
LDLC SERPL CALC-MCNC: 114 MG/DL
LYMPHOCYTES # BLD AUTO: 2.26 X10*3/UL (ref 1.2–4.8)
LYMPHOCYTES NFR BLD AUTO: 38.2 %
MCH RBC QN AUTO: 34.8 PG (ref 26–34)
MCHC RBC AUTO-ENTMCNC: 35.6 G/DL (ref 32–36)
MCV RBC AUTO: 98 FL (ref 80–100)
MICROALBUMIN UR-MCNC: 17 MG/L
MICROALBUMIN/CREAT UR: 7.5 UG/MG CREAT
MONOCYTES # BLD AUTO: 0.6 X10*3/UL (ref 0.1–1)
MONOCYTES NFR BLD AUTO: 10.1 %
NEUTROPHILS # BLD AUTO: 2.84 X10*3/UL (ref 1.2–7.7)
NEUTROPHILS NFR BLD AUTO: 48 %
NON HDL CHOLESTEROL: 151 MG/DL (ref 0–149)
NRBC BLD-RTO: 0 /100 WBCS (ref 0–0)
PLATELET # BLD AUTO: 184 X10*3/UL (ref 150–450)
POTASSIUM SERPL-SCNC: 4.2 MMOL/L (ref 3.5–5.3)
PROT SERPL-MCNC: 6.2 G/DL (ref 6.4–8.2)
PSA SERPL-MCNC: 0.54 NG/ML
RBC # BLD AUTO: 4.28 X10*6/UL (ref 4.5–5.9)
SODIUM SERPL-SCNC: 141 MMOL/L (ref 136–145)
T4 FREE SERPL-MCNC: 0.54 NG/DL (ref 0.61–1.12)
TRIGL SERPL-MCNC: 183 MG/DL (ref 0–149)
TSH SERPL-ACNC: 5.15 MIU/L (ref 0.44–3.98)
VLDL: 37 MG/DL (ref 0–40)
WBC # BLD AUTO: 5.9 X10*3/UL (ref 4.4–11.3)

## 2024-04-29 PROCEDURE — 36415 COLL VENOUS BLD VENIPUNCTURE: CPT

## 2024-04-29 PROCEDURE — 84443 ASSAY THYROID STIM HORMONE: CPT

## 2024-04-29 PROCEDURE — 82570 ASSAY OF URINE CREATININE: CPT

## 2024-04-29 PROCEDURE — 84439 ASSAY OF FREE THYROXINE: CPT

## 2024-04-29 PROCEDURE — G0103 PSA SCREENING: HCPCS

## 2024-04-29 PROCEDURE — 85025 COMPLETE CBC W/AUTO DIFF WBC: CPT

## 2024-04-29 PROCEDURE — 80061 LIPID PANEL: CPT

## 2024-04-29 PROCEDURE — 82043 UR ALBUMIN QUANTITATIVE: CPT

## 2024-04-29 PROCEDURE — 80053 COMPREHEN METABOLIC PANEL: CPT

## 2024-05-01 ENCOUNTER — OFFICE VISIT (OUTPATIENT)
Dept: PRIMARY CARE | Facility: CLINIC | Age: 64
End: 2024-05-01
Payer: MEDICARE

## 2024-05-01 VITALS
WEIGHT: 191.2 LBS | HEART RATE: 109 BPM | TEMPERATURE: 97.7 F | BODY MASS INDEX: 30.73 KG/M2 | HEIGHT: 66 IN | SYSTOLIC BLOOD PRESSURE: 148 MMHG | DIASTOLIC BLOOD PRESSURE: 84 MMHG

## 2024-05-01 DIAGNOSIS — M80.08XA AGE-RELATED OSTEOPOROSIS WITH CURRENT PATHOLOGICAL FRACTURE, VERTEBRA(E), INITIAL ENCOUNTER FOR FRACTURE (MULTI): ICD-10-CM

## 2024-05-01 DIAGNOSIS — Z71.89 ADVANCE DIRECTIVE DISCUSSED WITH PATIENT: ICD-10-CM

## 2024-05-01 DIAGNOSIS — E03.9 HYPOTHYROIDISM, UNSPECIFIED TYPE: ICD-10-CM

## 2024-05-01 DIAGNOSIS — Z71.89 ENCOUNTER FOR CARDIAC RISK COUNSELING: ICD-10-CM

## 2024-05-01 DIAGNOSIS — H10.13 ALLERGIC CONJUNCTIVITIS OF BOTH EYES: ICD-10-CM

## 2024-05-01 DIAGNOSIS — E78.2 MIXED HYPERLIPIDEMIA: ICD-10-CM

## 2024-05-01 DIAGNOSIS — I48.91 ATRIAL FIBRILLATION, UNSPECIFIED TYPE (MULTI): ICD-10-CM

## 2024-05-01 DIAGNOSIS — Z12.11 SCREENING FOR COLORECTAL CANCER: ICD-10-CM

## 2024-05-01 DIAGNOSIS — M25.551 RIGHT HIP PAIN: ICD-10-CM

## 2024-05-01 DIAGNOSIS — S22.070D COMPRESSION FRACTURE OF T10 VERTEBRA WITH ROUTINE HEALING, SUBSEQUENT ENCOUNTER: ICD-10-CM

## 2024-05-01 DIAGNOSIS — I48.91 UNSPECIFIED ATRIAL FIBRILLATION (MULTI): ICD-10-CM

## 2024-05-01 DIAGNOSIS — Z23 NEED FOR VACCINATION: ICD-10-CM

## 2024-05-01 DIAGNOSIS — S22.070D COMPRESSION FRACTURE OF T10 VERTEBRA WITH ROUTINE HEALING: ICD-10-CM

## 2024-05-01 DIAGNOSIS — Z00.00 ROUTINE GENERAL MEDICAL EXAMINATION AT HEALTH CARE FACILITY: Primary | ICD-10-CM

## 2024-05-01 DIAGNOSIS — Z13.6 SCREENING FOR CARDIOVASCULAR CONDITION: ICD-10-CM

## 2024-05-01 DIAGNOSIS — I10 ESSENTIAL HYPERTENSION, BENIGN: ICD-10-CM

## 2024-05-01 DIAGNOSIS — K76.0 HEPATIC STEATOSIS: ICD-10-CM

## 2024-05-01 DIAGNOSIS — Z12.12 SCREENING FOR COLORECTAL CANCER: ICD-10-CM

## 2024-05-01 DIAGNOSIS — K80.20 CALCULUS OF GALLBLADDER WITHOUT CHOLECYSTITIS WITHOUT OBSTRUCTION: ICD-10-CM

## 2024-05-01 PROBLEM — H10.10 ALLERGIC CONJUNCTIVITIS: Status: ACTIVE | Noted: 2024-05-01

## 2024-05-01 PROCEDURE — 3008F BODY MASS INDEX DOCD: CPT | Performed by: FAMILY MEDICINE

## 2024-05-01 PROCEDURE — 1160F RVW MEDS BY RX/DR IN RCRD: CPT | Performed by: FAMILY MEDICINE

## 2024-05-01 PROCEDURE — 1036F TOBACCO NON-USER: CPT | Performed by: FAMILY MEDICINE

## 2024-05-01 PROCEDURE — 99214 OFFICE O/P EST MOD 30 MIN: CPT | Performed by: FAMILY MEDICINE

## 2024-05-01 PROCEDURE — 3077F SYST BP >= 140 MM HG: CPT | Performed by: FAMILY MEDICINE

## 2024-05-01 PROCEDURE — G0402 INITIAL PREVENTIVE EXAM: HCPCS | Performed by: FAMILY MEDICINE

## 2024-05-01 PROCEDURE — 90677 PCV20 VACCINE IM: CPT | Performed by: FAMILY MEDICINE

## 2024-05-01 PROCEDURE — G0403 EKG FOR INITIAL PREVENT EXAM: HCPCS | Performed by: FAMILY MEDICINE

## 2024-05-01 PROCEDURE — 1159F MED LIST DOCD IN RCRD: CPT | Performed by: FAMILY MEDICINE

## 2024-05-01 PROCEDURE — 1123F ACP DISCUSS/DSCN MKR DOCD: CPT | Performed by: FAMILY MEDICINE

## 2024-05-01 PROCEDURE — 1170F FXNL STATUS ASSESSED: CPT | Performed by: FAMILY MEDICINE

## 2024-05-01 PROCEDURE — 3079F DIAST BP 80-89 MM HG: CPT | Performed by: FAMILY MEDICINE

## 2024-05-01 PROCEDURE — G0009 ADMIN PNEUMOCOCCAL VACCINE: HCPCS | Performed by: FAMILY MEDICINE

## 2024-05-01 PROCEDURE — 1157F ADVNC CARE PLAN IN RCRD: CPT | Performed by: FAMILY MEDICINE

## 2024-05-01 RX ORDER — ROSUVASTATIN CALCIUM 40 MG/1
40 TABLET, COATED ORAL DAILY
Qty: 90 TABLET | Refills: 0 | Status: SHIPPED | OUTPATIENT
Start: 2024-05-01

## 2024-05-01 RX ORDER — METOPROLOL SUCCINATE 50 MG/1
50 TABLET, EXTENDED RELEASE ORAL DAILY
Qty: 90 TABLET | Refills: 1 | Status: SHIPPED | OUTPATIENT
Start: 2024-05-01

## 2024-05-01 RX ORDER — RIVAROXABAN 20 MG/1
20 TABLET, FILM COATED ORAL DAILY
Qty: 90 TABLET | Refills: 1 | Status: SHIPPED | OUTPATIENT
Start: 2024-05-01 | End: 2024-10-28

## 2024-05-01 RX ORDER — LEVOTHYROXINE SODIUM 25 UG/1
25 TABLET ORAL DAILY
Qty: 90 TABLET | Refills: 1 | Status: SHIPPED | OUTPATIENT
Start: 2024-05-01 | End: 2025-05-01

## 2024-05-01 RX ORDER — CHLORTHALIDONE 25 MG/1
12.5 TABLET ORAL DAILY
Qty: 45 TABLET | Refills: 0 | Status: SHIPPED | OUTPATIENT
Start: 2024-05-01 | End: 2024-07-30

## 2024-05-01 RX ORDER — OLOPATADINE HYDROCHLORIDE 1 MG/ML
1 SOLUTION/ DROPS OPHTHALMIC 2 TIMES DAILY PRN
Qty: 5 ML | Refills: 0 | Status: SHIPPED | OUTPATIENT
Start: 2024-05-01 | End: 2024-08-29

## 2024-05-01 ASSESSMENT — ACTIVITIES OF DAILY LIVING (ADL)
GROCERY_SHOPPING: INDEPENDENT
TAKING_MEDICATION: INDEPENDENT
DRESSING: INDEPENDENT
MANAGING_FINANCES: INDEPENDENT
DOING_HOUSEWORK: INDEPENDENT
BATHING: INDEPENDENT

## 2024-05-01 ASSESSMENT — ENCOUNTER SYMPTOMS
BLOOD IN STOOL: 0
ABDOMINAL PAIN: 0
DYSURIA: 0
CONSTIPATION: 0
COUGH: 0
HEADACHES: 0
PALPITATIONS: 0
CHEST TIGHTNESS: 0
DIARRHEA: 0
SHORTNESS OF BREATH: 0
ACTIVITY CHANGE: 0
LIGHT-HEADEDNESS: 0
EYE DISCHARGE: 1
ARTHRALGIAS: 1
DIZZINESS: 0
FEVER: 0
EYE ITCHING: 1
DIFFICULTY URINATING: 0
RHINORRHEA: 1
FATIGUE: 0

## 2024-05-01 ASSESSMENT — PATIENT HEALTH QUESTIONNAIRE - PHQ9
2. FEELING DOWN, DEPRESSED OR HOPELESS: NOT AT ALL
SUM OF ALL RESPONSES TO PHQ9 QUESTIONS 1 AND 2: 0
1. LITTLE INTEREST OR PLEASURE IN DOING THINGS: NOT AT ALL

## 2024-05-01 ASSESSMENT — VISUAL ACUITY: OU: 1

## 2024-05-01 NOTE — PROGRESS NOTES
Subjective   Reason for Visit: Norris Espinal is an 63 y.o. male here for a Medicare Wellness visit.     Past Medical, Surgical, and Family History reviewed and updated in chart.    Reviewed all medications by prescribing practitioner or clinical pharmacist (such as prescriptions, OTCs, herbal therapies and supplements) and documented in the medical record.    HPI  HTN-currently on combo of losartan plus metoprolol.  No lightheadedness, dizziness  Hyperlipidemia-tolerates current dose of rosuvastatin without side effect  Atrial fibrillation-rate control with beta-blocker, anticoagulation with Xarelto, follows with cardiology, recently completed Holter monitor to assess A-fib burden, report pending  R hip pain-onset of discomfort in past 1 to 2 weeks.  Triggered after going up and down steps.  Aches at nighttime, but no pain with direct pressure to outer hip.  No give way symptoms.  No recent fall  Allergic rhinitis-increased congestion, itchy watery eyes, sneezing.  Breakthrough ocular symptoms despite regular use of OTC Zyrtec and nasal corticosteroid.    Patient Care Team:  Court Diallo MD as PCP - General  Court Diallo [facility]  Eliu Larios MD as Consulting Physician (Cardiology)  Mao Zhou MD as Referring Physician (Gastroenterology)     Review of Systems   Constitutional:  Negative for activity change, fatigue and fever.   HENT:  Positive for congestion, postnasal drip, rhinorrhea and sneezing.    Eyes:  Positive for discharge and itching. Negative for visual disturbance.   Respiratory:  Negative for cough, chest tightness and shortness of breath.    Cardiovascular:  Negative for chest pain, palpitations and leg swelling.   Gastrointestinal:  Negative for abdominal pain, blood in stool, constipation and diarrhea.   Genitourinary:  Negative for difficulty urinating and dysuria.   Musculoskeletal:  Positive for arthralgias.   Neurological:  Negative for  "dizziness, light-headedness and headaches.       Objective   Vitals:  /84 (BP Location: Left arm, Patient Position: Sitting)   Pulse 109   Temp 36.5 °C (97.7 °F)   Ht 1.676 m (5' 6\")   Wt 86.7 kg (191 lb 3.2 oz)   BMI 30.86 kg/m²       Physical Exam  Vitals reviewed.   Constitutional:       General: He is not in acute distress.     Appearance: Normal appearance.   HENT:      Head: Normocephalic and atraumatic.      Right Ear: Tympanic membrane and ear canal normal.      Left Ear: Tympanic membrane and ear canal normal.      Nose: Congestion and rhinorrhea present. Rhinorrhea is clear.      Right Turbinates: Swollen.      Left Turbinates: Swollen.      Mouth/Throat:      Mouth: Mucous membranes are moist.      Pharynx: Oropharynx is clear.   Eyes:      General: Vision grossly intact.         Right eye: Discharge present.         Left eye: Discharge present.     Extraocular Movements: Extraocular movements intact.      Conjunctiva/sclera:      Right eye: Right conjunctiva is injected.      Left eye: Left conjunctiva is injected.      Pupils: Pupils are equal, round, and reactive to light.      Comments: Watery discharge   Cardiovascular:      Rate and Rhythm: Normal rate and regular rhythm.      Pulses: Normal pulses.      Heart sounds: Normal heart sounds. No murmur heard.  Pulmonary:      Effort: Pulmonary effort is normal. No respiratory distress.      Breath sounds: Normal breath sounds.   Abdominal:      General: Abdomen is flat. Bowel sounds are normal.      Palpations: Abdomen is soft.      Tenderness: There is no abdominal tenderness.   Musculoskeletal:         General: Normal range of motion.      Cervical back: Normal range of motion and neck supple.      Right hip: Tenderness present. No bony tenderness. Normal range of motion. Normal strength.      Left hip: No tenderness or bony tenderness. Normal range of motion. Normal strength.      Comments: Pain with palpation over right gluteus medius, " negative Marisa's and Chris's.   Lymphadenopathy:      Cervical: No cervical adenopathy.   Skin:     General: Skin is warm and dry.   Neurological:      General: No focal deficit present.      Mental Status: He is alert and oriented to person, place, and time.   Psychiatric:         Mood and Affect: Mood normal.         Thought Content: Thought content normal.         Assessment/Plan   Problem List Items Addressed This Visit       Essential hypertension, benign    Suboptimal BP control, add chlorthalidone, recheck potassium in 2 to 3 weeks  Relevant Medications    chlorthalidone (Hygroton) 25 mg tablet    Other Relevant Orders    Comprehensive Metabolic Panel    Basic metabolic panel    Mixed hyperlipidemia    Suboptimal lipid control, increase statin to 40 mg.  Recheck LFTs and lipids in 3 to 4 months  Relevant Medications    rosuvastatin (Crestor) 40 mg tablet    Other Relevant Orders    Comprehensive Metabolic Panel    Lipid Panel    Atrial fibrillation (Multi)    Continue Xarelto for for stroke risk reduction and metoprolol for relevant Medications    Xarelto 20 mg tablet    metoprolol succinate XL (Toprol-XL) 50 mg 24 hr tablet    Hypothyroidism    Recent thyroid functions consistent with mild hypothyroidism.  Start low-dose levothyroxine.  Instructed on proper administration of levothyroxine.  Reviewed most recent CT chest ordered by relevant Medications    levothyroxine (Synthroid, Levoxyl) 25 mcg tablet    Other Relevant Orders    TSH with reflex to Free T4 if abnormal    Calculus of gallbladder without cholecystitis without obstruction    CT surgery.  No recurrence of previously seen pleural effusion, but new findings identified including large gallstone and hepatic steatosis.  Proceed with ultrasound to assess specific size of stone.  Relevant Orders    US right upper quadrant    Hepatic steatosis    Relevant Orders    US right upper quadrant    Compression fracture of T10 vertebra with routine healing    CT  chest also revealed thoracic compression fracture.  Obtain bone density to assess for osteo porosis  Relevant Orders    XR DEXA bone density    Vitamin D 25-Hydroxy,Total (for eval of Vitamin D levels)    Allergic conjunctivitis    Trial of Patanol for additional symptom relief relevant Medications    olopatadine (Patanol) 0.1 % ophthalmic solution     Other Visit Diagnoses       Routine general medical examination at health care facility    -  Primary    Screening for cardiovascular condition      Encounter for cardiac risk counseling  Cardiac Risk Assessment  Cardiovascular risk was discussed .   ASCVD 10 year risk score: 17.6%  Lifestyle modifications recommended, including nutritional choices, exercise, and elimination of habits contributing to risk.   Optimize BP control and lipid control with dose adjustments as noted.  Would not advise adding aspirin due to increased bleeding risk vwith current Xarelto use      Relevant Orders    ECG 12 lead (Completed)    Need for vaccination        Flu shot in the fall, pneumococcal vaccine today  Relevant Orders    Flu vaccine, high dose seasonal, preservative free    Pneumococcal conjugate vaccine 20-valent IM (Completed)    Compression fracture of T10 vertebra with routine healing, subsequent encounter        Relevant Orders    XR DEXA bone density    Screening for colorectal cancer        Due for follow-up colonoscopy.  Relevant Orders    Referral to Gastroenterology    Unspecified atrial fibrillation (Multi)        Relevant Medications    metoprolol succinate XL (Toprol-XL) 50 mg 24 hr tablet    Advance directive discussed with patient            Healthcare agent identified, encouraged to bring copies of medical power of  and living will    Right hip pain        Suspect right gluteal medius strain as etiology of symptoms.  Declined PT referral.  We discussed stretches.  Obtain x-ray if symptoms persist relevant Orders    XR hip right with pelvis when performed  2 or 3 views    Age-related osteoporosis with current pathological fracture, vertebra(e), initial encounter for fracture (Multi)        Relevant Orders    XR DEXA bone density    Vitamin D 25-Hydroxy,Total (for eval of Vitamin D levels)

## 2024-05-06 ENCOUNTER — HOSPITAL ENCOUNTER (OUTPATIENT)
Dept: RADIOLOGY | Facility: HOSPITAL | Age: 64
Discharge: HOME | End: 2024-05-06
Payer: MEDICARE

## 2024-05-06 DIAGNOSIS — K76.0 HEPATIC STEATOSIS: ICD-10-CM

## 2024-05-06 DIAGNOSIS — K80.20 CALCULUS OF GALLBLADDER WITHOUT CHOLECYSTITIS WITHOUT OBSTRUCTION: ICD-10-CM

## 2024-05-06 PROCEDURE — 76705 ECHO EXAM OF ABDOMEN: CPT

## 2024-05-06 PROCEDURE — 76705 ECHO EXAM OF ABDOMEN: CPT | Performed by: STUDENT IN AN ORGANIZED HEALTH CARE EDUCATION/TRAINING PROGRAM

## 2024-05-07 ENCOUNTER — HOSPITAL ENCOUNTER (OUTPATIENT)
Dept: RADIOLOGY | Facility: HOSPITAL | Age: 64
Discharge: HOME | End: 2024-05-07
Payer: MEDICARE

## 2024-05-07 DIAGNOSIS — S22.070D COMPRESSION FRACTURE OF T10 VERTEBRA WITH ROUTINE HEALING, SUBSEQUENT ENCOUNTER: ICD-10-CM

## 2024-05-07 DIAGNOSIS — M80.08XA AGE-RELATED OSTEOPOROSIS WITH CURRENT PATHOLOGICAL FRACTURE, VERTEBRA(E), INITIAL ENCOUNTER FOR FRACTURE (MULTI): ICD-10-CM

## 2024-05-07 DIAGNOSIS — S22.070D COMPRESSION FRACTURE OF T10 VERTEBRA WITH ROUTINE HEALING: ICD-10-CM

## 2024-05-07 PROCEDURE — 77080 DXA BONE DENSITY AXIAL: CPT

## 2024-05-13 ENCOUNTER — TELEPHONE (OUTPATIENT)
Dept: CARDIOLOGY | Facility: CLINIC | Age: 64
End: 2024-05-13
Payer: MEDICARE

## 2024-05-13 DIAGNOSIS — I48.0 PAROXYSMAL ATRIAL FIBRILLATION (MULTI): ICD-10-CM

## 2024-05-13 DIAGNOSIS — R94.31 HOLTER MONITOR, ABNORMAL: ICD-10-CM

## 2024-05-13 DIAGNOSIS — I49.1 PAC (PREMATURE ATRIAL CONTRACTION): ICD-10-CM

## 2024-05-13 NOTE — TELEPHONE ENCOUNTER
I called patient and left message that Dr Larios reviewed Holter monitor and he has ordered a referral to Dr Patino in EP at [facility]. Patient is to call if any questions. Referral sent to Dr Larios to sign.

## 2024-05-13 NOTE — TELEPHONE ENCOUNTER
----- Message from Eliu Larios MD sent at 5/11/2024 10:01 PM EDT -----  Frequent atrial runs on holter - schedule f/up to discuss

## 2024-05-15 NOTE — TELEPHONE ENCOUNTER
I called patient again and left message to call to give okay for referral to Dr Patino   Holter monitor was abnormal .

## 2024-07-18 ENCOUNTER — APPOINTMENT (OUTPATIENT)
Dept: CARDIOLOGY | Facility: CLINIC | Age: 64
End: 2024-07-18
Payer: MEDICARE

## 2024-07-18 VITALS
HEART RATE: 59 BPM | BODY MASS INDEX: 30.37 KG/M2 | DIASTOLIC BLOOD PRESSURE: 80 MMHG | SYSTOLIC BLOOD PRESSURE: 122 MMHG | WEIGHT: 189 LBS | HEIGHT: 66 IN

## 2024-07-18 DIAGNOSIS — I48.0 PAROXYSMAL ATRIAL FIBRILLATION (MULTI): ICD-10-CM

## 2024-07-18 DIAGNOSIS — R94.31 HOLTER MONITOR, ABNORMAL: ICD-10-CM

## 2024-07-18 DIAGNOSIS — I49.1 PAC (PREMATURE ATRIAL CONTRACTION): ICD-10-CM

## 2024-07-18 NOTE — PROGRESS NOTES
Subjective:  The patient is a 63-year-old white male, followed primarily by Dr. Court Diallo and from a cardiology standpoint by Dr. Alee Larios, who presents to discuss management of frequent atrial ectopy and paroxysmal atrial fibrillation.  He has a history of chronic hypertension, hyperlipidemia, and a nonischemic cardiomyopathy with an LVEF of 40% but normal coronary arteries by catheterization in 2022.    The patient reports that he has had paroxysmal atrial fibrillation over the past few years, and states that he was once set up for an elective cardioversion at [employer], but converted spontaneously and did not require a shock.  He wore a cardiac event monitor in April 2024, showing sinus rhythm from 43 to 138 bpm with an average of 91 bpm, but a 30% PAC burden with frequent runs of atrial tachycardia up to 36 beats in duration, and even 1 minute of atrial fibrillation.  The patient wears a smart watch, and states that it reports to him that he is out of rhythm virtually every day, though he is not symptomatic with these spells.    The patient's history is also remarkable for hypothyroidism and a congenital cervical spine problem where his missing C3-C4 or has some type of congenital fusion of these vertebral bodies.    The patient is  and lives at home with his wife.  They try to walk outdoors on a daily basis.  The patient is retired from a job in information technology.    Current Outpatient Medications   Medication Sig    chlorthalidone (Hygroton) 25 mg tablet Take 0.5 tablets (12.5 mg) by mouth once daily.    cyanocobalamin, vitamin B-12, 1,000 mcg capsule Take 1 capsule by mouth once daily.    levothyroxine (Synthroid, Levoxyl) 25 mcg tablet Take 1 tablet (25 mcg) by mouth once daily.    loratadine (Claritin) 10 mg tablet Take 1 tablet (10 mg) by mouth once daily.    losartan (Cozaar) 100 mg tablet Take 1 tablet (100 mg) by mouth once daily.    metoprolol succinate XL (Toprol-XL) 50 mg 24 hr  "tablet Take 1 tablet (50 mg) by mouth once daily.    rosuvastatin (Crestor) 40 mg tablet Take 1 tablet (40 mg) by mouth once daily.    Xarelto 20 mg tablet Take 1 tablet (20 mg) by mouth once daily. take with evening meal     Allergies:  Penicillins and Sulfamethoxazole     Patient Active Problem List   Diagnosis    Cardiomyopathy (Multi)    Elevated LFTs    Essential hypertension, benign    Macrocytosis    Mixed hyperlipidemia    Atrial fibrillation (Multi)    Infected insect bite    Chest wall contusion, left, sequela    Chronic left shoulder pain    Closed fracture of multiple ribs of left side    Edema leg    Mild shortness of breath    Overweight (BMI 25.0-29.9)    Pleural effusion, left    Gastroesophageal reflux disease with esophagitis    History of rectal cancer    BMI 31.0-31.9,adult    Hypothyroidism    Calculus of gallbladder without cholecystitis without obstruction    Hepatic steatosis    Compression fracture of T10 vertebra with routine healing    Allergic conjunctivitis     Past Surgical History:   Procedure Laterality Date    LUNG DECORTICATION  09/09/2023    OTHER SURGICAL HISTORY  07/26/2022    Cardiac catheterization    OTHER SURGICAL HISTORY  06/03/2022    Colon surgery-rectal CA resection 2006    OTHER SURGICAL HISTORY  06/22/2022    Hand surgery-skin grafts to correct congenital defect    PLEURA BIOPSY       Objective:  Vitals:    07/18/24 1258   BP: 122/80   Pulse: 59   Height:     1.676 m (5' 6\")  Weight: 85.7 kg (189 lb)     Exam:  Gen: Stocky middle-aged gentleman in no distress.  HEENT: No scleral icterus.  Neck: Very short neck; unable to move head independently of shoulders.  Lungs: Clear to auscultation, with no wheezes or rales.  Heart: Irregular rhythm around 100 bpm at the time of my exam, with no murmurs or gallops.  Abdomen: Obese but benign, with no organomegaly or masses.  Extremities: Intact distal pulses; no edema.  Neuro: No focal neurologic abnormalities.  Skin: No cutaneous " lesions.    Impressions:  1.  Chronic hypertension, under reasonable control.  2.  Mild cardiomyopathy (LVEF 40%) in the absence of any coronary disease by 2022 catheterization.  The patient is on beta-blockers, losartan, and chlorthalidone, and is free of heart failure symptoms.  3.  Hypothyroidism, on replacement therapy.  4.  Supraventricular arrhythmias, with frequent premature atrial complexes (30% burden), which are quite often from a pulmonary vein origin.  He has had runs of atrial tachycardia frequently, and even some transient atrial fibrillation by recent event monitoring.  The etiology of his atrial arrhythmias is unclear.  He has a [id number] score of at least 2 (hypertension, age 63), and he is appropriately anticoagulated with Xarelto.  5.  Hyperlipidemia, on statin therapy.  6.  Congenital cervical spine problem, with very limited head motion, similar to that of a patient with ankylosing spondylitis.    Recommendations:  1.  I spoke with the patient and his wife about the 3 major goals for patients with supraventricular tachyarrhythmias, particularly paroxysmal atrial fibrillation.  These include rate control, prevention of thromboembolic events, and rhythm control.  2.  I spoke with the patient about the possibility of specific antiarrhythmic therapy.  Given his normal coronary arteries, he may be a candidate for a drug such as flecainide, though its use is controversial in the setting of mild-to-moderate left ventricular dysfunction.  We will avoid antiarrhythmics presently.  3.  I believe that a better long-term option is to have the patient undergo pulmonary vein isolation.  If most of his frequent PACs are from a pulmonary venous source, this may eliminate the large burden of atrial ectopy.  If the patient gets a good result from a PVI, he could conceivably be weaned off beta-blockers and Xarelto in the future.  He will be referred to see Dr. Lizzie Taylor to discuss possible PVI in  [place].      Emanuel Gutierrez MD

## 2024-07-29 DIAGNOSIS — I10 ESSENTIAL HYPERTENSION, BENIGN: ICD-10-CM

## 2024-07-29 DIAGNOSIS — E78.2 MIXED HYPERLIPIDEMIA: ICD-10-CM

## 2024-07-30 RX ORDER — CHLORTHALIDONE 25 MG/1
TABLET ORAL
Qty: 45 TABLET | Refills: 1 | Status: SHIPPED | OUTPATIENT
Start: 2024-07-30

## 2024-07-30 RX ORDER — ROSUVASTATIN CALCIUM 40 MG/1
40 TABLET, COATED ORAL DAILY
Qty: 90 TABLET | Refills: 1 | Status: SHIPPED | OUTPATIENT
Start: 2024-07-30

## 2024-07-31 ENCOUNTER — OFFICE VISIT (OUTPATIENT)
Dept: CARDIOLOGY | Facility: CLINIC | Age: 64
End: 2024-07-31
Payer: MEDICARE

## 2024-07-31 VITALS
TEMPERATURE: 97.5 F | DIASTOLIC BLOOD PRESSURE: 84 MMHG | SYSTOLIC BLOOD PRESSURE: 113 MMHG | OXYGEN SATURATION: 94 % | WEIGHT: 187 LBS | HEART RATE: 61 BPM | BODY MASS INDEX: 28.34 KG/M2 | HEIGHT: 68 IN

## 2024-07-31 DIAGNOSIS — I48.0 PAROXYSMAL ATRIAL FIBRILLATION (MULTI): Primary | ICD-10-CM

## 2024-07-31 PROCEDURE — 99204 OFFICE O/P NEW MOD 45 MIN: CPT | Performed by: INTERNAL MEDICINE

## 2024-07-31 PROCEDURE — 1123F ACP DISCUSS/DSCN MKR DOCD: CPT | Performed by: INTERNAL MEDICINE

## 2024-07-31 PROCEDURE — G2211 COMPLEX E/M VISIT ADD ON: HCPCS | Performed by: INTERNAL MEDICINE

## 2024-07-31 PROCEDURE — 93010 ELECTROCARDIOGRAM REPORT: CPT | Performed by: INTERNAL MEDICINE

## 2024-07-31 PROCEDURE — 99214 OFFICE O/P EST MOD 30 MIN: CPT | Performed by: INTERNAL MEDICINE

## 2024-07-31 PROCEDURE — 3079F DIAST BP 80-89 MM HG: CPT | Performed by: INTERNAL MEDICINE

## 2024-07-31 PROCEDURE — 1159F MED LIST DOCD IN RCRD: CPT | Performed by: INTERNAL MEDICINE

## 2024-07-31 PROCEDURE — 93005 ELECTROCARDIOGRAM TRACING: CPT | Performed by: INTERNAL MEDICINE

## 2024-07-31 PROCEDURE — 1036F TOBACCO NON-USER: CPT | Performed by: INTERNAL MEDICINE

## 2024-07-31 PROCEDURE — 1157F ADVNC CARE PLAN IN RCRD: CPT | Performed by: INTERNAL MEDICINE

## 2024-07-31 PROCEDURE — 3008F BODY MASS INDEX DOCD: CPT | Performed by: INTERNAL MEDICINE

## 2024-07-31 PROCEDURE — 3074F SYST BP LT 130 MM HG: CPT | Performed by: INTERNAL MEDICINE

## 2024-07-31 NOTE — PROGRESS NOTES
Referring Provider: Emanuel Gutierrez MD  Reason for Consult: Atrial fibrillation    History of Present Illness:      Norris Espinal is a 64 y.o. year old male patient with a history significant for nonischemic cardiomyopathy, paroxysmal atrial fibrillation, frequent PACs, hypertension, hyperlipidemia, and congenital vertebral malformation who is referred by Dr. Gutierrez for consideration of A-fib ablation.    He was initially diagnosed with paroxysmal atrial fibrillation a few years ago after it was caught on a twelve-lead EKG.  Subsequent workup included a nuclear stress test, which showed a depressed ejection fraction and no ischemia, followed by cardiac catheterization which confirmed the absence of coronary artery disease.  He is paroxysmal and has not required cardioversions.  He wore a cardiac event monitor in April 2024 which showed a 30% burden of PACs and frequent runs of atrial tachycardia up to 36 beats in duration, as well as 1 minute of atrial fibrillation.  He is asymptomatic during these episodes.    Focused Cardiovascular Problem List:  Paroxysmal atrial fibrillation: [id number] equals 3.  On metoprolol and Xarelto.  Frequent PACs  Nonischemic cardiomyopathy (LVEF 40%)  Hypertension  Hyperlipidemia      Past Medical and Surgical History:  Mr. Espinal  has a past medical history of Allergic contact dermatitis due to plants, except food (10/21/2013), Other conditions influencing health status (04/07/2018), Personal history of other diseases of the respiratory system (12/09/2014), and Personal history of other diseases of the respiratory system (04/10/2015).    has a past surgical history that includes Other surgical history (07/26/2022); Other surgical history (06/03/2022); Other surgical history (06/22/2022); Pleura biopsy; and Lung decortication (09/09/2023).    Social History:  Social History     Tobacco Use    Smoking status: Never     Passive exposure: Past    Smokeless  "tobacco: Never   Substance Use Topics    Alcohol use: Yes     Alcohol/week: 21.0 standard drinks of alcohol     Types: 21 Standard drinks or equivalent per week      Tobacco: Denies  Alcohol:  3 drinks / day  Drug use:  Denies  Occupational History: Retired      Relevant Family History:   Family History   Problem Relation Name Age of Onset    Colon cancer Mother  49    Diabetes type I Father      Other (myocardial infarction) Father      Heart attack Father            Allergies:  Allergies   Allergen Reactions    Penicillins Unknown    Sulfamethoxazole Unknown        Medications:  Current Outpatient Medications   Medication Instructions    chlorthalidone (Hygroton) 25 mg tablet TAKE 0.5 TABLETS BY MOUTH ONCE DAILY.    cyanocobalamin, vitamin B-12, 1,000 mcg capsule 1 capsule, oral, Daily    levothyroxine (SYNTHROID, LEVOXYL) 25 mcg, oral, Daily    loratadine (CLARITIN) 10 mg, oral, Daily    losartan (COZAAR) 100 mg, oral, Daily    metoprolol succinate XL (TOPROL-XL) 50 mg, oral, Daily    rosuvastatin (CRESTOR) 40 mg, oral, Daily    Xarelto 20 mg, oral, Daily, take with evening meal         Objective   Physical Exam:  Last Recorded Vitals:      1/31/2024     1:08 PM 2/21/2024    11:47 AM 3/28/2024     2:21 PM 5/1/2024     2:22 PM 5/1/2024     3:05 PM 7/18/2024    12:58 PM 7/31/2024     3:37 PM   Vitals   Systolic 145 142 136 149 148 122 113   Diastolic 77 93 72 107 84 80 84   Heart Rate 50 105 55 109  59 61   Temp 36.3 °C (97.4 °F) 36.3 °C (97.3 °F)  36.5 °C (97.7 °F)   36.4 °C (97.5 °F)   Resp  16        Height (in) 1.672 m (5' 5.83\") 1.67 m (5' 5.75\")  1.67 m (5' 5.75\") 1.676 m (5' 6\")  1.676 m (5' 6\") 1.727 m (5' 8\")   Weight (lb) 189.8 192.1 192 191.2  189 187   BMI 30.79 kg/m2 31.24 kg/m2 31.23 kg/m2 30.86 kg/m2  30.51 kg/m2 28.43 kg/m2   BSA (m2) 2 m2 2.01 m2 2.01 m2 2.01 m2  2 m2 2.02 m2   Visit Report Report Report Report Report Report Report Report       Significant value    Visit Vitals  /84 (BP " "Location: Left arm, Patient Position: Sitting)   Pulse 61   Temp 36.4 °C (97.5 °F) (Temporal)   Ht 1.727 m (5' 8\")   Wt 84.8 kg (187 lb)   SpO2 94%   BMI 28.43 kg/m²   Smoking Status Never   BSA 2.02 m²      Gen: NAD, sitting comfortably  HEENT: NC/AT  Card: Irregular rhythm, regular rate, no m/r/g  Pulm: Clear to auscultation bilaterally  Ext: No LE edema  Neuro: No focal deficits    Diagnostic Results      My Interpretation of Reviewed Study(s):  Prior ECGs (reviewed and my interpretation):   7/31/2024: Sinus rhythm with frequent PACs, left anterior fascicular block      Echocardiography:  8/2023  CONCLUSIONS:  1. Poorly visualized anatomical structures due to suboptimal image quality.  2. Left ventricular systolic function was not well visualized.  3. Poorly visualized LV cavity makes it difficult to estimate LVEF. However, it appears to be mildly reduced.      Cardiac Catheterization:   7/2022: No coronary artery disease        Relevant Labs:  Lab Results   Component Value Date    CREATININE 0.99 04/29/2024    CREATININE 0.86 09/09/2023    CREATININE 0.97 09/08/2023    K 4.2 04/29/2024    K 3.8 09/09/2023    K 4.7 09/08/2023    HGBA1C 4.7 06/08/2022    HGB 14.9 04/29/2024    HGB 13.5 09/09/2023    HGB CANCELED 09/08/2023    INR 1.0 09/09/2023    INR 1.1 09/08/2023    INR 1.0 09/05/2023    AST 41 (H) 04/29/2024    AST 50 (H) 03/07/2023    AST 81 (H) 08/23/2022    ALT 45 04/29/2024    ALT 49 03/07/2023     (H) 08/23/2022       Assessment/Plan   Assessment and Plan:  In summary, patient is a pleasant 64 y.o.male with a history of recurrent atrial fibrillation and frequent PVCs in the setting of nonischemic cardiomyopathy diagnosed in 2022, has been maintained on metoprolol, who presents today for initial consultation.  He has a nonischemic cardiomyopathy which may be related to his frequent ectopy and atrial fibrillation. Treatment options of atrial fibrillation were discussed with the patient and all " questions were answered. These options included: 1. Rate control drug therapy with AVN blockers to slow down the heart rate, 2. Rhythm control with anti-arrhythmic drugs and with cardioversion to restore normal sinus rhythm, and/or 3. Radiofrequency (RF) ablation. In particular, RF ablation of atrial fibrillation was discussed in detail.    We discussed the potential benefits of AF ablation including freedom from AF without any medical therapy or, in some cases, a significant improvement in AF burden on medical therapy. We also discussed that AF ablation has been shown to be the most effective way to maintain a normal rhythm. Normal rhythm is associated with less heart failure hospitalizations, development of congestive heart failure, major adverse cardiac events, and cognitive dysfunction. Furthermore, in patients with systolic dysfunction such as Mr. Espinal, AF ablation is associated with a significant mortality benefit and can prevent hospitalizations for heart failure.     We also discussed that ablation procedure is not indicated for the purpose of discontinuing oral anticoagulation, and the success rate for catheter ablation for AF, meaning no recurrence of any atrial fibrillation, is currently approximately 70-80% for paroxysmal atrial fibrillation. A minority of patients may require an additional touch-up procedure. However, it is highly successful at reducing overall A-fib burden as well as symptomatic recurrences. The risks of the procedure were also extensively discussed, including but not limited to infection, blood vessel damage, heart injury or perforation necessitating emergency cardiac surgery, heart attack or stroke, atrial-esophageal fistula, pulmonary vein stenosis, phrenic nerve injury, even death.      Finally, the risk of stroke associated with atrial fibrillation was discussed - as the patient has a [id number] score of  3 , the patient will be maintained on Xarelto for CVA  prevention.    After extensive discussion, Mr. Espinal wishes to proceed with AF and PAC ablation.    Name: Norris Espinal  MRN: [id number]  Attending: Lizzie Taylor MD  Procedure: Atrial fibrillation ablation  Date desired: 8/12/2024  Diagnosis: Paroxysmal atrial fibrillation  Vendor if applicable: [employer]  Expected anesthesia: General  Isolation/precautions?:  None  Other comments/med instructions: Hold all medications the a.m. of the procedure             Return to Clinic:  After ablation    Thank you very much for allowing me to participate in the care of this patient. Please do not hesitate to contact me with any further questions or concerns.    Lizzie Taylor MD  Clinical Cardiac Electrophysiologist, [facility] [place]    of Medicine, [employer]  Director of Atrial Fibrillation Ablation, [facility]  Director of Ventricular Arrhythmias Research, [facility]  Office Phone Number: [phone]

## 2024-08-01 ENCOUNTER — APPOINTMENT (OUTPATIENT)
Dept: PRIMARY CARE | Facility: CLINIC | Age: 64
End: 2024-08-01
Payer: MEDICARE

## 2024-08-08 ENCOUNTER — APPOINTMENT (OUTPATIENT)
Dept: PRIMARY CARE | Facility: CLINIC | Age: 64
End: 2024-08-08
Payer: MEDICARE

## 2024-09-12 ENCOUNTER — APPOINTMENT (OUTPATIENT)
Dept: CARDIOLOGY | Facility: CLINIC | Age: 64
End: 2024-09-12
Payer: MEDICARE

## 2024-09-16 DIAGNOSIS — K80.20 CALCULUS OF GALLBLADDER WITHOUT CHOLECYSTITIS WITHOUT OBSTRUCTION: ICD-10-CM

## 2024-09-16 DIAGNOSIS — R79.89 ELEVATED LFTS: Primary | ICD-10-CM

## 2024-09-16 DIAGNOSIS — K76.0 HEPATIC STEATOSIS: ICD-10-CM

## 2024-09-19 ENCOUNTER — APPOINTMENT (OUTPATIENT)
Dept: CARDIOLOGY | Facility: CLINIC | Age: 64
End: 2024-09-19
Payer: MEDICARE

## 2024-09-19 VITALS
HEIGHT: 68 IN | BODY MASS INDEX: 28.34 KG/M2 | DIASTOLIC BLOOD PRESSURE: 70 MMHG | HEART RATE: 104 BPM | SYSTOLIC BLOOD PRESSURE: 118 MMHG | WEIGHT: 187 LBS

## 2024-09-19 DIAGNOSIS — Z78.9 NEVER SMOKED TOBACCO: ICD-10-CM

## 2024-09-19 DIAGNOSIS — I10 ESSENTIAL HYPERTENSION, BENIGN: ICD-10-CM

## 2024-09-19 DIAGNOSIS — I42.9 CARDIOMYOPATHY, UNSPECIFIED TYPE (MULTI): ICD-10-CM

## 2024-09-19 DIAGNOSIS — E66.3 OVERWEIGHT (BMI 25.0-29.9): ICD-10-CM

## 2024-09-19 DIAGNOSIS — I48.91 ATRIAL FIBRILLATION, UNSPECIFIED TYPE (MULTI): ICD-10-CM

## 2024-09-19 DIAGNOSIS — E78.2 MIXED HYPERLIPIDEMIA: ICD-10-CM

## 2024-09-19 PROCEDURE — 1036F TOBACCO NON-USER: CPT | Performed by: INTERNAL MEDICINE

## 2024-09-19 PROCEDURE — 3008F BODY MASS INDEX DOCD: CPT | Performed by: INTERNAL MEDICINE

## 2024-09-19 PROCEDURE — 99214 OFFICE O/P EST MOD 30 MIN: CPT | Performed by: INTERNAL MEDICINE

## 2024-09-19 PROCEDURE — 1159F MED LIST DOCD IN RCRD: CPT | Performed by: INTERNAL MEDICINE

## 2024-09-19 PROCEDURE — 1157F ADVNC CARE PLAN IN RCRD: CPT | Performed by: INTERNAL MEDICINE

## 2024-09-19 PROCEDURE — 3078F DIAST BP <80 MM HG: CPT | Performed by: INTERNAL MEDICINE

## 2024-09-19 PROCEDURE — 1123F ACP DISCUSS/DSCN MKR DOCD: CPT | Performed by: INTERNAL MEDICINE

## 2024-09-19 PROCEDURE — 3074F SYST BP LT 130 MM HG: CPT | Performed by: INTERNAL MEDICINE

## 2024-09-19 ASSESSMENT — ENCOUNTER SYMPTOMS
NEUROLOGICAL NEGATIVE: 1
RESPIRATORY NEGATIVE: 1
CARDIOVASCULAR NEGATIVE: 1
CONSTITUTIONAL NEGATIVE: 1

## 2024-09-19 NOTE — PROGRESS NOTES
CARDIOLOGY OFFICE VISIT      CHIEF COMPLAINT  Chief Complaint   Patient presents with    Follow-up        HISTORY OF PRESENT ILLNESS  Norris Espinal is a 64 y.o. year old male patient with history of proximal atrial fibrillation who has continued to maintain a sinus rhythm with frequent breakthrough episodes.  He had a recent 48-hour Holter monitor that showed heart rate between 43 to 138 bpm with episodes of paroxysmal atrial fibrillation and frequent PACs which represents about 30% of the total recording with multiple atrial runs.  He was subsequently evaluated by Dr. Taylor and is scheduled for A-fib ablation in the next week.  He has not been feeling any recent palpitations.  Continues to deny any chest pain or significant shortness of breath with his day-to-day activities.  He remains on beta-blockers and Xarelto for long-term anticoagulation.  He had cardiac catheterization in 2022 that was normal.  Echo shows mild LV dysfunction with EF of 45%.    ASSESSMENT AND PLAN  1.  Paroxysmal A-fib: He continues to have frequent breakthrough episodes and frequent PACs as noted above which essentially asymptomatic.  Patient is scheduled for PVI as noted above and will follow-up after the procedure.  2.  Hypertension: Blood pressure is well-controlled current medications which are continued.  3.  Nonischemic cardiomyopathy with mild LV dysfunction which is stable and continues to be asymptomatic.    Problem List Items Addressed This Visit             ICD-10-CM    Cardiomyopathy (Multi) I42.9    Essential hypertension, benign I10    Mixed hyperlipidemia E78.2    Atrial fibrillation (Multi) I48.91    Overweight (BMI 25.0-29.9) E66.3    Never smoked tobacco Z78.9       Recent Cardiovascular Testing:    Echo-8/2023 CONCLUSIONS:  1. Poorly visualized anatomical structures due to suboptimal image quality.  2. Left ventricular systolic function was not well visualized.  3. Poorly visualized LV cavity makes it difficult to  estimate LVEF. However, it appears to be mildly reduced.  Stress-6/2022 IMPRESSION:  Abnormal Lexiscan Myoview cardiac perfusion stress test.  No myocardial ischemia by perfusion imaging.  No myocardial infarction by perfusion imaging.  Abnormal left ventricular systolic function.  Left ventricular ejection fraction 40 %.  There are no prior studies available for comparison  Patient with good of functional capacity at 12.1 Mets.  Cath-7/2022 CONCLUSIONS:   1. The entire Left Main: 0% stenosis.   2. Entire LAD Lesion: The percent stenosis is 0%.   3. Entire CX Lesion: The percent stenosis is 0%.   4. The entire RCA Lesion: The percent stenosis is 0%.   5. The Left Ventricular Ejection Fraction is 40%, by echo.  Carotid Ultrasound-    Past Medical History  Past Medical History:   Diagnosis Date    Allergic contact dermatitis due to plants, except food 10/21/2013    Contact dermatitis due to poison ivy    Arrhythmia     Hyperlipidemia     Hypertension     Non-ischemic cardiomyopathy (Multi)     Other conditions influencing health status 04/07/2018    History of cough    Personal history of other diseases of the respiratory system 12/09/2014    History of acute sinusitis    Personal history of other diseases of the respiratory system 04/10/2015    History of acute bronchitis with bronchospasm       Social History  Social History     Tobacco Use    Smoking status: Never     Passive exposure: Past    Smokeless tobacco: Never   Vaping Use    Vaping status: Never Used   Substance Use Topics    Alcohol use: Yes     Alcohol/week: 21.0 standard drinks of alcohol     Types: 21 Standard drinks or equivalent per week    Drug use: Not Currently       Family History     Family History   Problem Relation Name Age of Onset    Colon cancer Mother  49    Diabetes type I Father      Other (myocardial infarction) Father      Heart attack Father          Allergies:  Allergies   Allergen Reactions    Penicillins Unknown    Sulfamethoxazole  "Unknown        Outpatient Medications:  Current Outpatient Medications   Medication Instructions    chlorthalidone (Hygroton) 25 mg tablet TAKE 0.5 TABLETS BY MOUTH ONCE DAILY.    cyanocobalamin, vitamin B-12, 1,000 mcg capsule 1 capsule, oral, Daily    levothyroxine (SYNTHROID, LEVOXYL) 25 mcg, oral, Daily    loratadine (CLARITIN) 10 mg, oral, Daily    losartan (COZAAR) 100 mg, oral, Daily    metoprolol succinate XL (TOPROL-XL) 50 mg, oral, Daily    rosuvastatin (CRESTOR) 40 mg, oral, Daily    Xarelto 20 mg, oral, Daily, take with evening meal        Recent Lab Results:    CBC:   Lab Results   Component Value Date    WBC 5.9 04/29/2024    RBC 4.28 (L) 04/29/2024    HGB 14.9 04/29/2024    HCT 41.9 04/29/2024     04/29/2024        CMP:    Lab Results   Component Value Date     04/29/2024    K 4.2 04/29/2024     04/29/2024    CO2 27 04/29/2024    BUN 20 04/29/2024    CREATININE 0.99 04/29/2024    GLUCOSE 93 04/29/2024    CALCIUM 9.3 04/29/2024       Magnesium:    Lab Results   Component Value Date    MG 2.12 09/09/2023       Lipid Profile:    Lab Results   Component Value Date    TRIG 183 (H) 04/29/2024    HDL 60.0 04/29/2024    LDLCALC 114 (H) 04/29/2024       TSH:    Lab Results   Component Value Date    TSH 5.15 (H) 04/29/2024       BNP:   No results found for: \"BNP\"     PT/INR:    Lab Results   Component Value Date    PROTIME 11.5 09/09/2023    INR 1.0 09/09/2023       HgBA1c:    Lab Results   Component Value Date    HGBA1C 4.7 06/08/2022       BMP:  Lab Results   Component Value Date     04/29/2024     09/09/2023     09/08/2023     09/05/2023    K 4.2 04/29/2024    K 3.8 09/09/2023    K 4.7 09/08/2023    K 4.2 09/05/2023     04/29/2024     09/09/2023    CL 99 09/08/2023     09/05/2023    CO2 27 04/29/2024    CO2 25 09/09/2023    CO2 26 09/08/2023    CO2 24 09/05/2023    BUN 20 04/29/2024    BUN 23 09/09/2023    BUN 18 09/08/2023    BUN 15 09/05/2023    " "CREATININE 0.99 04/29/2024    CREATININE 0.86 09/09/2023    CREATININE 0.97 09/08/2023    CREATININE 0.84 09/05/2023       CBC:  Lab Results   Component Value Date    WBC 5.9 04/29/2024    WBC 7.2 09/09/2023    WBC CANCELED 09/08/2023    WBC 8.2 09/08/2023    RBC 4.28 (L) 04/29/2024    RBC 3.89 (L) 09/09/2023    RBC CANCELED 09/08/2023    RBC 4.51 09/08/2023    HGB 14.9 04/29/2024    HGB 13.5 09/09/2023    HGB CANCELED 09/08/2023    HGB 15.3 09/08/2023    HCT 41.9 04/29/2024    HCT 39.8 (L) 09/09/2023    HCT CANCELED 09/08/2023    HCT 44.2 09/08/2023    MCV 98 04/29/2024     (H) 09/09/2023    MCV CANCELED 09/08/2023    MCV 98 09/08/2023    MCH 34.8 (H) 04/29/2024    MCHC 35.6 04/29/2024    MCHC 33.9 09/09/2023    MCHC CANCELED 09/08/2023    MCHC 34.6 09/08/2023    RDW 12.7 04/29/2024    RDW 13.0 09/09/2023    RDW CANCELED 09/08/2023    RDW 12.9 09/08/2023     04/29/2024     (L) 09/09/2023    PLT CANCELED 09/08/2023     09/08/2023       Cardiac Enzymes:    No results found for: \"TROPHS\"    Hepatic Function Panel:    Lab Results   Component Value Date    ALKPHOS 57 04/29/2024    ALT 45 04/29/2024    AST 41 (H) 04/29/2024    PROT 6.2 (L) 04/29/2024    BILITOT 0.7 04/29/2024         REVIEW OF SYSTEMS  Review of Systems   Constitutional: Negative.   Cardiovascular: Negative.    Respiratory: Negative.     Neurological: Negative.        VITALS  Vitals:    09/19/24 1421   BP: 118/70   Pulse: 104     Wt Readings from Last 4 Encounters:   09/19/24 84.8 kg (187 lb)   07/31/24 84.8 kg (187 lb)   07/18/24 85.7 kg (189 lb)   05/01/24 86.7 kg (191 lb 3.2 oz)       PHYSICAL EXAM  Constitutional:       Appearance: Healthy appearance.      Comments: overweight   Neck:      Thyroid: Thyroid normal.      Vascular: JVD normal.   Pulmonary:      Effort: Pulmonary effort is normal.      Breath sounds: Normal breath sounds.   Cardiovascular:      Normal rate. Irregularly irregular rhythm.      Murmurs: There is " no murmur.   Edema:     Peripheral edema absent.   Musculoskeletal: Normal range of motion. Skin:     General: Skin is warm and dry.   Neurological:      General: No focal deficit present.      Mental Status: Alert and oriented to person, place and time.         Scribe Attestation  By signing my name below, KATE Elva WILLIAM Toscano LPN  , Scribe   attest that this documentation has been prepared under the direction and in the presence of Eliu Larios MD.

## 2024-09-23 ENCOUNTER — ANESTHESIA EVENT (OUTPATIENT)
Dept: CARDIOLOGY | Facility: HOSPITAL | Age: 64
End: 2024-09-23
Payer: MEDICARE

## 2024-09-23 ENCOUNTER — ANESTHESIA (OUTPATIENT)
Dept: CARDIOLOGY | Facility: HOSPITAL | Age: 64
End: 2024-09-23
Payer: MEDICARE

## 2024-09-23 ENCOUNTER — HOSPITAL ENCOUNTER (OUTPATIENT)
Facility: HOSPITAL | Age: 64
Setting detail: OUTPATIENT SURGERY
Discharge: HOME | End: 2024-09-23
Attending: INTERNAL MEDICINE | Admitting: INTERNAL MEDICINE
Payer: MEDICARE

## 2024-09-23 ENCOUNTER — APPOINTMENT (OUTPATIENT)
Dept: CARDIOLOGY | Facility: HOSPITAL | Age: 64
End: 2024-09-23
Payer: MEDICARE

## 2024-09-23 DIAGNOSIS — I48.91 ATRIAL FIBRILLATION, UNSPECIFIED TYPE (MULTI): ICD-10-CM

## 2024-09-23 DIAGNOSIS — I48.91 UNSPECIFIED ATRIAL FIBRILLATION (MULTI): ICD-10-CM

## 2024-09-23 DIAGNOSIS — I48.0 PAROXYSMAL ATRIAL FIBRILLATION (MULTI): Primary | ICD-10-CM

## 2024-09-23 LAB
ABO GROUP (TYPE) IN BLOOD: NORMAL
ABO GROUP (TYPE) IN BLOOD: NORMAL
ACT BLD: NORMAL S
ALBUMIN SERPL BCP-MCNC: 4.8 G/DL (ref 3.4–5)
ALP SERPL-CCNC: 62 U/L (ref 33–136)
ALT SERPL W P-5'-P-CCNC: 40 U/L (ref 10–52)
ANION GAP SERPL CALC-SCNC: 13 MMOL/L (ref 10–20)
ANTIBODY SCREEN: NORMAL
APTT PPP: 37 SECONDS (ref 27–38)
AST SERPL W P-5'-P-CCNC: 36 U/L (ref 9–39)
BASOPHILS # BLD AUTO: 0.02 X10*3/UL (ref 0–0.1)
BASOPHILS NFR BLD AUTO: 0.4 %
BILIRUB SERPL-MCNC: 0.8 MG/DL (ref 0–1.2)
BUN SERPL-MCNC: 23 MG/DL (ref 6–23)
CALCIUM SERPL-MCNC: 9.4 MG/DL (ref 8.6–10.3)
CHLORIDE SERPL-SCNC: 104 MMOL/L (ref 98–107)
CO2 SERPL-SCNC: 26 MMOL/L (ref 21–32)
CREAT SERPL-MCNC: 0.98 MG/DL (ref 0.5–1.3)
EGFRCR SERPLBLD CKD-EPI 2021: 85 ML/MIN/1.73M*2
EOSINOPHIL # BLD AUTO: 0.15 X10*3/UL (ref 0–0.7)
EOSINOPHIL NFR BLD AUTO: 3 %
ERYTHROCYTE [DISTWIDTH] IN BLOOD BY AUTOMATED COUNT: 12.4 % (ref 11.5–14.5)
GLUCOSE SERPL-MCNC: 113 MG/DL (ref 74–99)
HCT VFR BLD AUTO: 40.8 % (ref 41–52)
HGB BLD-MCNC: 14.7 G/DL (ref 13.5–17.5)
IMM GRANULOCYTES NFR BLD AUTO: 0.6 % (ref 0–0.9)
INR PPP: 1.1 (ref 0.9–1.1)
LYMPHOCYTES # BLD AUTO: 1.24 X10*3/UL (ref 1.2–4.8)
LYMPHOCYTES NFR BLD AUTO: 24.6 %
MCHC RBC AUTO-ENTMCNC: 36 G/DL (ref 32–36)
MCV RBC AUTO: 98 FL (ref 80–100)
MONOCYTES # BLD AUTO: 0.6 X10*3/UL (ref 0.1–1)
MONOCYTES NFR BLD AUTO: 11.9 %
NEUTROPHILS # BLD AUTO: 3 X10*3/UL (ref 1.2–7.7)
NEUTROPHILS NFR BLD AUTO: 59.5 %
PLATELET # BLD AUTO: 149 X10*3/UL (ref 150–450)
PMV BLD AUTO: 10.4 FL (ref 7.5–11.5)
POTASSIUM SERPL-SCNC: 4 MMOL/L (ref 3.5–5.3)
PROT SERPL-MCNC: 6.7 G/DL (ref 6.4–8.2)
PROTHROMBIN TIME: 12.3 SECONDS (ref 9.8–12.8)
RBC # BLD AUTO: 4.16 X10*6/UL (ref 4.5–5.9)
RH FACTOR (ANTIGEN D): NORMAL
RH FACTOR (ANTIGEN D): NORMAL
SODIUM SERPL-SCNC: 139 MMOL/L (ref 136–145)
WBC # BLD AUTO: 5 X10*3/UL (ref 4.4–11.3)

## 2024-09-23 PROCEDURE — 93653 COMPRE EP EVAL TX SVT: CPT | Mod: 59 | Performed by: INTERNAL MEDICINE

## 2024-09-23 PROCEDURE — C1732 CATH, EP, DIAG/ABL, 3D/VECT: HCPCS | Performed by: INTERNAL MEDICINE

## 2024-09-23 PROCEDURE — C1766 INTRO/SHEATH,STRBLE,NON-PEEL: HCPCS | Performed by: INTERNAL MEDICINE

## 2024-09-23 PROCEDURE — C1769 GUIDE WIRE: HCPCS | Performed by: INTERNAL MEDICINE

## 2024-09-23 PROCEDURE — 85025 COMPLETE CBC W/AUTO DIFF WBC: CPT | Performed by: NURSE PRACTITIONER

## 2024-09-23 PROCEDURE — 93656 COMPRE EP EVAL ABLTJ ATR FIB: CPT | Performed by: INTERNAL MEDICINE

## 2024-09-23 PROCEDURE — C1759 CATH, INTRA ECHOCARDIOGRAPHY: HCPCS | Performed by: INTERNAL MEDICINE

## 2024-09-23 PROCEDURE — 85610 PROTHROMBIN TIME: CPT | Performed by: NURSE PRACTITIONER

## 2024-09-23 PROCEDURE — 2720000007 HC OR 272 NO HCPCS: Performed by: INTERNAL MEDICINE

## 2024-09-23 PROCEDURE — C1730 CATH, EP, 19 OR FEW ELECT: HCPCS | Performed by: INTERNAL MEDICINE

## 2024-09-23 PROCEDURE — 2500000005 HC RX 250 GENERAL PHARMACY W/O HCPCS: Performed by: INTERNAL MEDICINE

## 2024-09-23 PROCEDURE — 2500000002 HC RX 250 W HCPCS SELF ADMINISTERED DRUGS (ALT 637 FOR MEDICARE OP, ALT 636 FOR OP/ED): Performed by: INTERNAL MEDICINE

## 2024-09-23 PROCEDURE — 7100000010 HC PHASE TWO TIME - EACH INCREMENTAL 1 MINUTE: Performed by: INTERNAL MEDICINE

## 2024-09-23 PROCEDURE — C1760 CLOSURE DEV, VASC: HCPCS | Performed by: INTERNAL MEDICINE

## 2024-09-23 PROCEDURE — 3700000002 HC GENERAL ANESTHESIA TIME - EACH INCREMENTAL 1 MINUTE: Performed by: INTERNAL MEDICINE

## 2024-09-23 PROCEDURE — 86923 COMPATIBILITY TEST ELECTRIC: CPT

## 2024-09-23 PROCEDURE — 2780000003 HC OR 278 NO HCPCS: Performed by: INTERNAL MEDICINE

## 2024-09-23 PROCEDURE — 85730 THROMBOPLASTIN TIME PARTIAL: CPT | Performed by: NURSE PRACTITIONER

## 2024-09-23 PROCEDURE — G0269 OCCLUSIVE DEVICE IN VEIN ART: HCPCS | Performed by: INTERNAL MEDICINE

## 2024-09-23 PROCEDURE — 86850 RBC ANTIBODY SCREEN: CPT | Performed by: NURSE PRACTITIONER

## 2024-09-23 PROCEDURE — 76937 US GUIDE VASCULAR ACCESS: CPT | Performed by: INTERNAL MEDICINE

## 2024-09-23 PROCEDURE — 2500000004 HC RX 250 GENERAL PHARMACY W/ HCPCS (ALT 636 FOR OP/ED): Performed by: NURSE ANESTHETIST, CERTIFIED REGISTERED

## 2024-09-23 PROCEDURE — 36415 COLL VENOUS BLD VENIPUNCTURE: CPT | Performed by: INTERNAL MEDICINE

## 2024-09-23 PROCEDURE — 3700000001 HC GENERAL ANESTHESIA TIME - INITIAL BASE CHARGE: Performed by: INTERNAL MEDICINE

## 2024-09-23 PROCEDURE — 7100000002 HC RECOVERY ROOM TIME - EACH INCREMENTAL 1 MINUTE: Performed by: INTERNAL MEDICINE

## 2024-09-23 PROCEDURE — 85347 COAGULATION TIME ACTIVATED: CPT | Performed by: INTERNAL MEDICINE

## 2024-09-23 PROCEDURE — 99024 POSTOP FOLLOW-UP VISIT: CPT | Performed by: NURSE PRACTITIONER

## 2024-09-23 PROCEDURE — 36415 COLL VENOUS BLD VENIPUNCTURE: CPT | Performed by: NURSE PRACTITIONER

## 2024-09-23 PROCEDURE — 7100000001 HC RECOVERY ROOM TIME - INITIAL BASE CHARGE: Performed by: INTERNAL MEDICINE

## 2024-09-23 PROCEDURE — 2500000005 HC RX 250 GENERAL PHARMACY W/O HCPCS: Performed by: NURSE ANESTHETIST, CERTIFIED REGISTERED

## 2024-09-23 PROCEDURE — 85347 COAGULATION TIME ACTIVATED: CPT

## 2024-09-23 PROCEDURE — 86901 BLOOD TYPING SEROLOGIC RH(D): CPT | Performed by: NURSE PRACTITIONER

## 2024-09-23 PROCEDURE — 93010 ELECTROCARDIOGRAM REPORT: CPT | Performed by: INTERNAL MEDICINE

## 2024-09-23 PROCEDURE — 93005 ELECTROCARDIOGRAM TRACING: CPT

## 2024-09-23 PROCEDURE — 93655 ICAR CATH ABLTJ DSCRT ARRHYT: CPT | Performed by: INTERNAL MEDICINE

## 2024-09-23 PROCEDURE — 7100000009 HC PHASE TWO TIME - INITIAL BASE CHARGE: Performed by: INTERNAL MEDICINE

## 2024-09-23 PROCEDURE — 80053 COMPREHEN METABOLIC PANEL: CPT | Performed by: NURSE PRACTITIONER

## 2024-09-23 RX ORDER — ROCURONIUM BROMIDE 10 MG/ML
INJECTION, SOLUTION INTRAVENOUS AS NEEDED
Status: DISCONTINUED | OUTPATIENT
Start: 2024-09-23 | End: 2024-09-23

## 2024-09-23 RX ORDER — PROTAMINE SULFATE 10 MG/ML
INJECTION, SOLUTION INTRAVENOUS AS NEEDED
Status: DISCONTINUED | OUTPATIENT
Start: 2024-09-23 | End: 2024-09-23

## 2024-09-23 RX ORDER — PHENYLEPHRINE 10 MG/250 ML(40 MCG/ML)IN 0.9 % SOD.CHLORIDE INTRAVENOUS
CONTINUOUS PRN
Status: DISCONTINUED | OUTPATIENT
Start: 2024-09-23 | End: 2024-09-23

## 2024-09-23 RX ORDER — IBUPROFEN 400 MG/1
400 TABLET ORAL EVERY 8 HOURS PRN
Status: DISCONTINUED | OUTPATIENT
Start: 2024-09-23 | End: 2024-09-23 | Stop reason: HOSPADM

## 2024-09-23 RX ORDER — LIDOCAINE HYDROCHLORIDE 10 MG/ML
INJECTION, SOLUTION EPIDURAL; INFILTRATION; INTRACAUDAL; PERINEURAL AS NEEDED
Status: DISCONTINUED | OUTPATIENT
Start: 2024-09-23 | End: 2024-09-23 | Stop reason: HOSPADM

## 2024-09-23 RX ORDER — FENTANYL CITRATE 50 UG/ML
25 INJECTION, SOLUTION INTRAMUSCULAR; INTRAVENOUS EVERY 5 MIN PRN
Status: DISCONTINUED | OUTPATIENT
Start: 2024-09-23 | End: 2024-09-23

## 2024-09-23 RX ORDER — PROPOFOL 10 MG/ML
INJECTION, EMULSION INTRAVENOUS AS NEEDED
Status: DISCONTINUED | OUTPATIENT
Start: 2024-09-23 | End: 2024-09-23

## 2024-09-23 RX ORDER — RIVAROXABAN 20 MG/1
20 TABLET, FILM COATED ORAL DAILY
Start: 2024-09-23

## 2024-09-23 RX ORDER — PANTOPRAZOLE SODIUM 40 MG/1
40 TABLET, DELAYED RELEASE ORAL
Status: DISCONTINUED | OUTPATIENT
Start: 2024-09-23 | End: 2024-09-23 | Stop reason: HOSPADM

## 2024-09-23 RX ORDER — LIDOCAINE HYDROCHLORIDE 20 MG/ML
INJECTION, SOLUTION INFILTRATION; PERINEURAL AS NEEDED
Status: DISCONTINUED | OUTPATIENT
Start: 2024-09-23 | End: 2024-09-23

## 2024-09-23 RX ORDER — FENTANYL CITRATE 50 UG/ML
INJECTION, SOLUTION INTRAMUSCULAR; INTRAVENOUS AS NEEDED
Status: DISCONTINUED | OUTPATIENT
Start: 2024-09-23 | End: 2024-09-23

## 2024-09-23 RX ORDER — ACETAMINOPHEN 325 MG/1
650 TABLET ORAL EVERY 4 HOURS PRN
Status: DISCONTINUED | OUTPATIENT
Start: 2024-09-23 | End: 2024-09-23

## 2024-09-23 RX ORDER — SUCCINYLCHOLINE CHLORIDE 100 MG/5ML
SYRINGE (ML) INTRAVENOUS AS NEEDED
Status: DISCONTINUED | OUTPATIENT
Start: 2024-09-23 | End: 2024-09-23

## 2024-09-23 RX ORDER — MEPERIDINE HYDROCHLORIDE 25 MG/ML
12.5 INJECTION INTRAMUSCULAR; INTRAVENOUS; SUBCUTANEOUS EVERY 10 MIN PRN
Status: DISCONTINUED | OUTPATIENT
Start: 2024-09-23 | End: 2024-09-23

## 2024-09-23 RX ORDER — PANTOPRAZOLE SODIUM 40 MG/1
40 TABLET, DELAYED RELEASE ORAL
Qty: 84 TABLET | Refills: 0 | Status: SHIPPED | OUTPATIENT
Start: 2024-09-23 | End: 2024-11-04

## 2024-09-23 RX ORDER — HEPARIN SODIUM 1000 [USP'U]/ML
INJECTION, SOLUTION INTRAVENOUS; SUBCUTANEOUS AS NEEDED
Status: DISCONTINUED | OUTPATIENT
Start: 2024-09-23 | End: 2024-09-23

## 2024-09-23 RX ORDER — OXYCODONE HYDROCHLORIDE 5 MG/1
10 TABLET ORAL EVERY 4 HOURS PRN
Status: DISCONTINUED | OUTPATIENT
Start: 2024-09-23 | End: 2024-09-23

## 2024-09-23 RX ORDER — METOPROLOL SUCCINATE 25 MG/1
25 TABLET, EXTENDED RELEASE ORAL DAILY
Qty: 30 TABLET | Refills: 5 | Status: SHIPPED | OUTPATIENT
Start: 2024-09-23 | End: 2025-03-22

## 2024-09-23 RX ORDER — PHENYLEPHRINE HCL IN 0.9% NACL 1 MG/10 ML
SYRINGE (ML) INTRAVENOUS AS NEEDED
Status: DISCONTINUED | OUTPATIENT
Start: 2024-09-23 | End: 2024-09-23

## 2024-09-23 RX ORDER — MIDAZOLAM HYDROCHLORIDE 1 MG/ML
INJECTION, SOLUTION INTRAMUSCULAR; INTRAVENOUS AS NEEDED
Status: DISCONTINUED | OUTPATIENT
Start: 2024-09-23 | End: 2024-09-23

## 2024-09-23 RX ORDER — FENTANYL CITRATE 50 UG/ML
50 INJECTION, SOLUTION INTRAMUSCULAR; INTRAVENOUS EVERY 5 MIN PRN
Status: DISCONTINUED | OUTPATIENT
Start: 2024-09-23 | End: 2024-09-23

## 2024-09-23 RX ORDER — SODIUM CHLORIDE, SODIUM LACTATE, POTASSIUM CHLORIDE, CALCIUM CHLORIDE 600; 310; 30; 20 MG/100ML; MG/100ML; MG/100ML; MG/100ML
100 INJECTION, SOLUTION INTRAVENOUS CONTINUOUS
Status: DISCONTINUED | OUTPATIENT
Start: 2024-09-23 | End: 2024-09-23

## 2024-09-23 RX ORDER — ONDANSETRON HYDROCHLORIDE 2 MG/ML
INJECTION, SOLUTION INTRAVENOUS AS NEEDED
Status: DISCONTINUED | OUTPATIENT
Start: 2024-09-23 | End: 2024-09-23

## 2024-09-23 RX ORDER — OXYCODONE HYDROCHLORIDE 5 MG/1
5 TABLET ORAL EVERY 4 HOURS PRN
Status: DISCONTINUED | OUTPATIENT
Start: 2024-09-23 | End: 2024-09-23

## 2024-09-23 RX ORDER — ACETAMINOPHEN 325 MG/1
650 TABLET ORAL EVERY 4 HOURS PRN
Status: DISCONTINUED | OUTPATIENT
Start: 2024-09-23 | End: 2024-09-23 | Stop reason: HOSPADM

## 2024-09-23 ASSESSMENT — PAIN SCALES - GENERAL
PAINLEVEL_OUTOF10: 0 - NO PAIN
PAIN_LEVEL: 0
PAINLEVEL_OUTOF10: 0 - NO PAIN

## 2024-09-23 ASSESSMENT — PAIN - FUNCTIONAL ASSESSMENT

## 2024-09-23 ASSESSMENT — COLUMBIA-SUICIDE SEVERITY RATING SCALE - C-SSRS
1. IN THE PAST MONTH, HAVE YOU WISHED YOU WERE DEAD OR WISHED YOU COULD GO TO SLEEP AND NOT WAKE UP?: NO
6. HAVE YOU EVER DONE ANYTHING, STARTED TO DO ANYTHING, OR PREPARED TO DO ANYTHING TO END YOUR LIFE?: NO
2. HAVE YOU ACTUALLY HAD ANY THOUGHTS OF KILLING YOURSELF?: NO

## 2024-09-23 NOTE — ANESTHESIA PROCEDURE NOTES
Arterial Line:    Date/Time: 9/23/2024 7:30 AM    Staffing  Performed: attending   Authorized by: Toni Osman MD    Performed by: Toni Osman MD    An arterial line was placed. Procedure performed using ultrasound guidance.in the pre-op for the following indication(s): continuous blood pressure monitoring and blood sampling needed.    A 20 gauge (size), 1 and 3/8 inch (length), Arrow (type) catheter was placed into the Right radial artery, secured by Tegaderm,   Seldinger technique used.  Events:  patient tolerated procedure well with no complications.

## 2024-09-23 NOTE — PROGRESS NOTES
Patient post PVI and atrial tachycardia ablation per Dr. Taylor.  See full operative report.  EKG shows sinus rhythm heart rate 71 bpm and QTc 443 ms.  Patient to continue on anticoagulation with Xarelto and reduce metoprolol succinate to 25 mg daily.  He will be initiated on Protonix 40 mg twice a day before meals for 6 weeks.  Patient to follow-up with EP NP in 4 weeks, 7-day Zio patch in 3 months, then 4-month follow-up with Dr. Taylor.

## 2024-09-23 NOTE — SIGNIFICANT EVENT
"Focused assessment performed and WDL. Right femoral venous puncture soft and stable with no hematoma or oozing. Pt sitting up 90 degrees and right groin site remains unchanged. Discharge instructions given via \"teach back\" method. Covered: Post EPS/Ablation discharge instructions/restrictions, medications/when to resume them, protonix and metoprolol information, follow up appointments, and perclose education. Pt and wife state understanding and answer follow up questions correctly. Pt ambulating ad ken with no complaints of dizziness/lightheadedness/pain. Ready to discharge home via wheelchair.  "

## 2024-09-23 NOTE — SIGNIFICANT EVENT
Pt back from PACU, sanguinous drainage to right groin unchanged from first assessment according to PACU staff, no complaints of pain, some soreness in throat, family at bedside, will continue to monitor

## 2024-09-23 NOTE — NURSING NOTE
Pt states he has not had taken his medications since Saturday, Sept 21st including his xarelto and his beta blocker, reached out to Dr Taylor, if pt in sinus rhythm give a dose of xarelto and proceed. Pt is sinus so xarelto given as ordered

## 2024-09-23 NOTE — ANESTHESIA PROCEDURE NOTES
Airway  Date/Time: 9/23/2024 7:59 AM  Urgency: elective    Difficult airway    Staffing  Performed: CRNA   Authorized by: Toni Osman MD    Performed by: BHARATI Haider-CRNA  Patient location during procedure: OR    Indications and Patient Condition  Indications for airway management: anesthesia and airway protection  Spontaneous Ventilation: absent  Sedation level: deep  Preoxygenated: yes  Patient position: no neck mobility.  MILS maintained throughout  Mask difficulty assessment: 0 - not attempted  Planned trial extubation    Final Airway Details  Final airway type: endotracheal airway      Successful airway: ETT  Cuffed: yes   Successful intubation technique: video laryngoscopy  Facilitating devices/methods: intubating stylet  Endotracheal tube insertion site: oral  Blade type: glidescope.  Blade size: #4  ETT size (mm): 7.5  Cormack-Lehane Classification: grade IIb - view of arytenoids or posterior of glottis only  Placement verified by: chest auscultation and capnometry   Cuff volume (mL): 7  Measured from: gums  ETT to gums (cm): 22  Number of attempts at approach: 1    Additional Comments  Atraumatic.

## 2024-09-23 NOTE — ANESTHESIA POSTPROCEDURE EVALUATION
Patient: Norris Espinal    Procedure Summary       Date: 09/23/24 Room / Location: [facility] / [facility]    Anesthesia Start: 0747 Anesthesia Stop: 1021    Procedure: Ablation A-Fib Diagnosis:       Paroxysmal atrial fibrillation (Multi)      (Paroxysmal atrial fibrillation (Multi) [I48.0])    Providers: Lizzie Taylor MD Responsible Provider: Toni Osman MD    Anesthesia Type: general ASA Status: 3            Anesthesia Type: general    Vitals Value Taken Time   /94 09/23/24 1018   Temp 36 09/23/24 1022   Pulse 66 09/23/24 1020   Resp 18 09/23/24 1020   SpO2 100 % 09/23/24 1020   Vitals shown include unfiled device data.    Anesthesia Post Evaluation    Patient location during evaluation: bedside  Patient participation: complete - patient participated  Level of consciousness: awake and alert  Pain score: 0  Pain management: adequate  Airway patency: patent  Cardiovascular status: acceptable and stable  Respiratory status: acceptable and face mask  Hydration status: stable  Postoperative Nausea and Vomiting: none        No notable events documented.

## 2024-09-23 NOTE — ANESTHESIA PREPROCEDURE EVALUATION
Norris Espinal is a 64 y.o. male here for:      Ablation A-Fib  With Lizzie Taylor MD  Pre-Op Diagnosis Codes:      * Atrial fibrillation (irregular heartbeat) [I48.0]    Lab Results   Component Value Date    HGB 14.9 04/29/2024    HCT 41.9 04/29/2024    WBC 5.9 04/29/2024     04/29/2024     09/23/2024    K 4.0 09/23/2024     09/23/2024    CREATININE 0.98 09/23/2024    BUN 23 09/23/2024       Social History     Substance and Sexual Activity   Drug Use Not Currently      Tobacco Use: Low Risk  (9/23/2024)    Patient History    • Smoking Tobacco Use: Never    • Smokeless Tobacco Use: Never    • Passive Exposure: Past      Social History     Substance and Sexual Activity   Alcohol Use Yes   • Alcohol/week: 21.0 standard drinks of alcohol   • Types: 21 Standard drinks or equivalent per week        Allergies   Allergen Reactions   • Penicillins Unknown   • Sulfamethoxazole Unknown       Current Outpatient Medications   Medication Instructions   • chlorthalidone (Hygroton) 25 mg tablet TAKE 0.5 TABLETS BY MOUTH ONCE DAILY.   • cyanocobalamin, vitamin B-12, 1,000 mcg capsule 1 capsule, oral, Daily, Last dose 9/21   • levothyroxine (SYNTHROID, LEVOXYL) 25 mcg, oral, Daily   • loratadine (CLARITIN) 10 mg, oral, Daily   • losartan (COZAAR) 100 mg, oral, Daily   • metoprolol succinate XL (TOPROL-XL) 50 mg, oral, Daily   • rosuvastatin (CRESTOR) 40 mg, oral, Daily   • Xarelto 20 mg, oral, Daily, take with evening meal       Past Medical History:   Diagnosis Date   • Allergic contact dermatitis due to plants, except food 10/21/2013    Contact dermatitis due to poison ivy   • Arrhythmia    • Cancer (Multi)    • Disease of thyroid gland    • Hyperlipidemia    • Hypertension    • Non-ischemic cardiomyopathy (Multi)    • Other conditions influencing health status 04/07/2018    History of cough   • Personal history of other diseases of the respiratory system 12/09/2014    History of acute sinusitis   • Personal  "history of other diseases of the respiratory system 04/10/2015    History of acute bronchitis with bronchospasm       Past Surgical History:   Procedure Laterality Date   • LUNG DECORTICATION  09/09/2023   • OTHER SURGICAL HISTORY  07/26/2022    Cardiac catheterization   • OTHER SURGICAL HISTORY  06/03/2022    Colon surgery-rectal CA resection 2006   • OTHER SURGICAL HISTORY  06/22/2022    Hand surgery-skin grafts to correct congenital defect   • PLEURA BIOPSY         Family History   Problem Relation Name Age of Onset   • Colon cancer Mother  49   • Diabetes type I Father     • Other (myocardial infarction) Father     • Heart attack Father         Relevant Problems   Cardiac   (+) Atrial fibrillation (Multi)   (+) Essential hypertension, benign   (+) Mixed hyperlipidemia      GI   (+) Gastroesophageal reflux disease with esophagitis      Liver   (+) Calculus of gallbladder without cholecystitis without obstruction   (+) Elevated LFTs   (+) Hepatic steatosis      Endocrine   (+) Hypothyroidism      ID   (+) Infected insect bite       Visit Vitals  BP (!) 151/92   Pulse 84   Temp 36.8 °C (98.2 °F) (Temporal)   Resp 18   Ht 1.727 m (5' 8\")   Wt 84.3 kg (185 lb 13.6 oz)   SpO2 95%   BMI 28.26 kg/m²   Smoking Status Never   BSA 2.01 m²       NPO Details:  NPO/Void Status  Date of Last Liquid: 09/22/24  Time of Last Liquid: 2000  Date of Last Solid: 09/22/24  Time of Last Solid: 2000  Last Intake Type: Clear fluids  Time of Last Void: 0500        Physical Exam    Airway  Mallampati: IV  TM distance: <3 FB  Neck ROM: limited  Comments: No neck mobility   Cardiovascular - normal exam     Dental - normal exam     Pulmonary - normal exam     Abdominal - normal exam  Abdomen: soft           Anesthesia Plan    History of general anesthesia?: yes  History of complications of general anesthesia?: no    ASA 3     general     intravenous induction   Anesthetic plan and risks discussed with patient.    Plan discussed with CRNA.    "

## 2024-09-23 NOTE — SIGNIFICANT EVENT
Rounded on Pt and right groin site soft and stable with no hematoma. Changed dressing at right groin, quick clot and tegaderm applied. Pt denies dizziness/lightheadedness/pain.

## 2024-09-23 NOTE — H&P
History Of Present Illness  Norris Espinal is a 64 y.o. male with a history significant for nonischemic cardiomyopathy, paroxysmal atrial fibrillation, frequent PACs, hypertension, hyperlipidemia, and congenital vertebral malformation who is referred by Dr. Gutierrez for consideration of A-fib ablation.     He was initially diagnosed with paroxysmal atrial fibrillation a few years ago after it was caught on a twelve-lead EKG.  Subsequent workup included a nuclear stress test, which showed a depressed ejection fraction and no ischemia, followed by cardiac catheterization which confirmed the absence of coronary artery disease.  He is paroxysmal and has not required cardioversions.  He wore a cardiac event monitor in April 2024 which showed a 30% burden of PACs and frequent runs of atrial tachycardia up to 36 beats in duration, as well as 1 minute of atrial fibrillation.  He is asymptomatic during these episodes.     Past Medical History  Past Medical History:   Diagnosis Date    Allergic contact dermatitis due to plants, except food 10/21/2013    Contact dermatitis due to poison ivy    Arrhythmia     Cancer (Multi)     Disease of thyroid gland     Hyperlipidemia     Hypertension     Non-ischemic cardiomyopathy (Multi)     Other conditions influencing health status 04/07/2018    History of cough    Personal history of other diseases of the respiratory system 12/09/2014    History of acute sinusitis    Personal history of other diseases of the respiratory system 04/10/2015    History of acute bronchitis with bronchospasm       Surgical History  Past Surgical History:   Procedure Laterality Date    LUNG DECORTICATION  09/09/2023    OTHER SURGICAL HISTORY  07/26/2022    Cardiac catheterization    OTHER SURGICAL HISTORY  06/03/2022    Colon surgery-rectal CA resection 2006    OTHER SURGICAL HISTORY  06/22/2022    Hand surgery-skin grafts to correct congenital defect    PLEURA BIOPSY          Social History  He reports that  "he has never smoked. He has been exposed to tobacco smoke. He has never used smokeless tobacco. He reports current alcohol use of about 21.0 standard drinks of alcohol per week. He reports that he does not currently use drugs.    Family History  Family History   Problem Relation Name Age of Onset    Colon cancer Mother  49    Diabetes type I Father      Other (myocardial infarction) Father      Heart attack Father          Allergies  Penicillins and Sulfamethoxazole    Review of Systems   All other systems reviewed and are negative.       Physical Exam  Gen: NAD, sitting comfortably  HEENT: NC/AT  Card: Irregular rhythm, regular rate, no m/r/g  Pulm: Clear to auscultation bilaterally  Ext: No LE edema  Neuro: No focal deficits     Last Recorded Vitals  Blood pressure 134/85, pulse 70, temperature 36 °C (96.8 °F), temperature source Temporal, resp. rate 18, height 1.727 m (5' 8\"), weight 84.3 kg (185 lb 13.6 oz), SpO2 92%.    Relevant Results             Assessment/Plan   Assessment & Plan  Atrial fibrillation (Multi)      64 year old here for elective AF ablation, plan to move forward with PVI and AT ablation.        I spent 20 minutes in the professional and overall care of this patient.      Lizzie Taylor MD    "

## 2024-09-23 NOTE — Clinical Note
Sheath was exchanged in the right femoral vein with SHEATH, GUIDING, DELVIS, 8.5F WITH CURVE VIZ  MDC.

## 2024-09-24 VITALS
WEIGHT: 185.85 LBS | HEIGHT: 68 IN | RESPIRATION RATE: 18 BRPM | BODY MASS INDEX: 28.17 KG/M2 | HEART RATE: 74 BPM | TEMPERATURE: 96.8 F | SYSTOLIC BLOOD PRESSURE: 140 MMHG | OXYGEN SATURATION: 95 % | DIASTOLIC BLOOD PRESSURE: 93 MMHG

## 2024-09-24 LAB
BLOOD EXPIRATION DATE: NORMAL
BLOOD EXPIRATION DATE: NORMAL
DISPENSE STATUS: NORMAL
DISPENSE STATUS: NORMAL
PRODUCT BLOOD TYPE: 5100
PRODUCT BLOOD TYPE: 5100
PRODUCT CODE: NORMAL
PRODUCT CODE: NORMAL
UNIT ABO: NORMAL
UNIT ABO: NORMAL
UNIT NUMBER: NORMAL
UNIT NUMBER: NORMAL
UNIT RH: NORMAL
UNIT RH: NORMAL
UNIT VOLUME: 304
UNIT VOLUME: 319
XM INTEP: NORMAL
XM INTEP: NORMAL

## 2024-09-27 LAB
ATRIAL RATE: 111 BPM
ATRIAL RATE: 71 BPM
P AXIS: -25 DEGREES
P AXIS: 11 DEGREES
P OFFSET: 185 MS
P OFFSET: 189 MS
P ONSET: 128 MS
P ONSET: 135 MS
PR INTERVAL: 154 MS
PR INTERVAL: 170 MS
Q ONSET: 212 MS
Q ONSET: 213 MS
QRS COUNT: 11 BEATS
QRS COUNT: 18 BEATS
QRS DURATION: 102 MS
QRS DURATION: 102 MS
QT INTERVAL: 364 MS
QT INTERVAL: 408 MS
QTC CALCULATION(BAZETT): 443 MS
QTC CALCULATION(BAZETT): 495 MS
QTC FREDERICIA: 431 MS
QTC FREDERICIA: 447 MS
R AXIS: -36 DEGREES
R AXIS: -53 DEGREES
T AXIS: 25 DEGREES
T AXIS: 5 DEGREES
T OFFSET: 394 MS
T OFFSET: 417 MS
VENTRICULAR RATE: 111 BPM
VENTRICULAR RATE: 71 BPM

## 2024-10-10 ENCOUNTER — APPOINTMENT (OUTPATIENT)
Dept: PRIMARY CARE | Facility: CLINIC | Age: 64
End: 2024-10-10
Payer: MEDICARE

## 2024-10-10 VITALS
BODY MASS INDEX: 28.49 KG/M2 | SYSTOLIC BLOOD PRESSURE: 138 MMHG | WEIGHT: 188 LBS | HEART RATE: 66 BPM | TEMPERATURE: 97.2 F | HEIGHT: 68 IN | DIASTOLIC BLOOD PRESSURE: 90 MMHG

## 2024-10-10 DIAGNOSIS — I10 ESSENTIAL HYPERTENSION, BENIGN: ICD-10-CM

## 2024-10-10 DIAGNOSIS — M80.08XA AGE-RELATED OSTEOPOROSIS WITH CURRENT PATHOLOGICAL FRACTURE, VERTEBRA(E), INITIAL ENCOUNTER FOR FRACTURE (MULTI): ICD-10-CM

## 2024-10-10 DIAGNOSIS — I10 ESSENTIAL (PRIMARY) HYPERTENSION: ICD-10-CM

## 2024-10-10 DIAGNOSIS — E03.9 HYPOTHYROIDISM, UNSPECIFIED TYPE: ICD-10-CM

## 2024-10-10 DIAGNOSIS — E78.2 MIXED HYPERLIPIDEMIA: ICD-10-CM

## 2024-10-10 DIAGNOSIS — S22.070D COMPRESSION FRACTURE OF T10 VERTEBRA WITH ROUTINE HEALING: ICD-10-CM

## 2024-10-10 PROBLEM — M50.90 CERVICAL DISC DISORDER: Status: ACTIVE | Noted: 2024-09-16

## 2024-10-10 PROCEDURE — 3075F SYST BP GE 130 - 139MM HG: CPT | Performed by: FAMILY MEDICINE

## 2024-10-10 PROCEDURE — 1158F ADVNC CARE PLAN TLK DOCD: CPT | Performed by: FAMILY MEDICINE

## 2024-10-10 PROCEDURE — 1160F RVW MEDS BY RX/DR IN RCRD: CPT | Performed by: FAMILY MEDICINE

## 2024-10-10 PROCEDURE — 3080F DIAST BP >= 90 MM HG: CPT | Performed by: FAMILY MEDICINE

## 2024-10-10 PROCEDURE — 99214 OFFICE O/P EST MOD 30 MIN: CPT | Performed by: FAMILY MEDICINE

## 2024-10-10 PROCEDURE — 3008F BODY MASS INDEX DOCD: CPT | Performed by: FAMILY MEDICINE

## 2024-10-10 PROCEDURE — G2211 COMPLEX E/M VISIT ADD ON: HCPCS | Performed by: FAMILY MEDICINE

## 2024-10-10 PROCEDURE — 1157F ADVNC CARE PLAN IN RCRD: CPT | Performed by: FAMILY MEDICINE

## 2024-10-10 PROCEDURE — 1123F ACP DISCUSS/DSCN MKR DOCD: CPT | Performed by: FAMILY MEDICINE

## 2024-10-10 PROCEDURE — 1159F MED LIST DOCD IN RCRD: CPT | Performed by: FAMILY MEDICINE

## 2024-10-10 RX ORDER — LOSARTAN POTASSIUM 100 MG/1
100 TABLET ORAL DAILY
Qty: 90 TABLET | Refills: 1 | Status: SHIPPED | OUTPATIENT
Start: 2024-10-10

## 2024-10-10 RX ORDER — CHLORTHALIDONE 25 MG/1
25 TABLET ORAL DAILY
Qty: 90 TABLET | Refills: 1 | Status: SHIPPED | OUTPATIENT
Start: 2024-10-10

## 2024-10-10 RX ORDER — LEVOTHYROXINE SODIUM 25 UG/1
25 TABLET ORAL DAILY
Qty: 90 TABLET | Refills: 1 | Status: SHIPPED | OUTPATIENT
Start: 2024-10-10 | End: 2025-10-10

## 2024-10-10 NOTE — PROGRESS NOTES
"Subjective   Patient ID: Norris Espinal is a 64 y.o. male who presents for Follow-up (Follow up states no concerns. ).    HPI   HTN-compliant with losartan and chlorthalidone.  Not routinely checking BP at home.  Denies headaches, lightheadedness, dizziness  Hyperlipidemia-stable on statin.  Hypothyroidism-stable on current dose of levothyroxine.  Atrial fibrillation-recently underwent ablation.  Continues on Xarelto anticoagulation.  History of gallstones with abnormal coarsened appearance of liver on ultrasound-still waiting to schedule with surgery for removal of gallbladder and intraoperative liver biopsy  Review of Systems  Per HPI  Objective   /90 (BP Location: Left arm, Patient Position: Sitting)   Pulse 66   Temp 36.2 °C (97.2 °F)   Ht 1.727 m (5' 8\")   Wt 85.3 kg (188 lb)   BMI 28.59 kg/m²     Physical Exam  Vitals reviewed.   Constitutional:       Appearance: Normal appearance. He is obese. He is not ill-appearing.   HENT:      Head: Normocephalic and atraumatic.      Mouth/Throat:      Mouth: Mucous membranes are moist.      Pharynx: Oropharynx is clear.   Eyes:      Extraocular Movements: Extraocular movements intact.      Conjunctiva/sclera: Conjunctivae normal.   Cardiovascular:      Rate and Rhythm: Normal rate and regular rhythm.      Pulses: Normal pulses.      Heart sounds: Normal heart sounds. No murmur heard.  Pulmonary:      Effort: Pulmonary effort is normal. No respiratory distress.      Breath sounds: Normal breath sounds.   Abdominal:      General: Bowel sounds are normal.      Palpations: Abdomen is soft.      Tenderness: There is no abdominal tenderness.   Musculoskeletal:      Cervical back: Neck supple.      Right lower leg: No edema.      Left lower leg: No edema.   Lymphadenopathy:      Cervical: No cervical adenopathy.   Skin:     General: Skin is warm and dry.      Findings: No rash.   Neurological:      General: No focal deficit present.      Mental Status: He is alert. "   Psychiatric:         Mood and Affect: Mood normal.         Behavior: Behavior normal.         Thought Content: Thought content normal.         Judgment: Judgment normal.         Assessment/Plan   Assessment & Plan  Essential hypertension, benign  Mild BP control by increasing chlorthalidone to 25 mg daily.  Repeat metabolic panel in 3 to 4 weeks.  Orders:    chlorthalidone (Hygroton) 25 mg tablet; Take 1 tablet (25 mg) by mouth once daily.    Comprehensive Metabolic Panel; Future    Hypothyroidism, unspecified type  Continue current levothyroxine dose.  Check thyroid functions  Orders:    levothyroxine (Synthroid, Levoxyl) 25 mcg tablet; Take 1 tablet (25 mcg) by mouth once daily.    TSH with reflex to Free T4 if abnormal; Future    Essential (primary) hypertension  Continue losartan at current dose  Orders:    losartan (Cozaar) 100 mg tablet; Take 1 tablet (100 mg) by mouth once daily.    Mixed hyperlipidemia  Continue rosuvastatin at current dose  Orders:    Comprehensive Metabolic Panel; Future    Lipid Panel; Future    Compression fracture of T10 vertebra with routine healing    Orders:    Vitamin D 25-Hydroxy,Total (for eval of Vitamin D levels); Future    Age-related osteoporosis with current pathological fracture, vertebra(e), initial encounter for fracture (Multi)    Orders:    Vitamin D 25-Hydroxy,Total (for eval of Vitamin D levels); Future

## 2024-10-15 NOTE — ASSESSMENT & PLAN NOTE
Continue rosuvastatin at current dose  Orders:    Comprehensive Metabolic Panel; Future    Lipid Panel; Future

## 2024-10-15 NOTE — ASSESSMENT & PLAN NOTE
Continue current levothyroxine dose.  Check thyroid functions  Orders:    levothyroxine (Synthroid, Levoxyl) 25 mcg tablet; Take 1 tablet (25 mcg) by mouth once daily.    TSH with reflex to Free T4 if abnormal; Future

## 2024-10-21 ENCOUNTER — APPOINTMENT (OUTPATIENT)
Dept: CARDIOLOGY | Facility: CLINIC | Age: 64
End: 2024-10-21
Payer: MEDICARE

## 2024-10-21 VITALS
HEART RATE: 72 BPM | DIASTOLIC BLOOD PRESSURE: 86 MMHG | HEIGHT: 68 IN | SYSTOLIC BLOOD PRESSURE: 118 MMHG | BODY MASS INDEX: 26.9 KG/M2 | WEIGHT: 177.5 LBS

## 2024-10-21 DIAGNOSIS — I48.0 PAROXYSMAL ATRIAL FIBRILLATION (MULTI): Primary | ICD-10-CM

## 2024-10-21 DIAGNOSIS — Z78.9 NEVER SMOKED TOBACCO: ICD-10-CM

## 2024-10-21 DIAGNOSIS — E78.2 MIXED HYPERLIPIDEMIA: ICD-10-CM

## 2024-10-21 DIAGNOSIS — E03.9 HYPOTHYROIDISM, UNSPECIFIED TYPE: ICD-10-CM

## 2024-10-21 DIAGNOSIS — I10 ESSENTIAL HYPERTENSION, BENIGN: ICD-10-CM

## 2024-10-21 DIAGNOSIS — I42.9 CARDIOMYOPATHY, UNSPECIFIED TYPE (MULTI): ICD-10-CM

## 2024-10-21 PROCEDURE — 1157F ADVNC CARE PLAN IN RCRD: CPT | Performed by: NURSE PRACTITIONER

## 2024-10-21 PROCEDURE — 1123F ACP DISCUSS/DSCN MKR DOCD: CPT | Performed by: NURSE PRACTITIONER

## 2024-10-21 PROCEDURE — 1159F MED LIST DOCD IN RCRD: CPT | Performed by: NURSE PRACTITIONER

## 2024-10-21 PROCEDURE — 3074F SYST BP LT 130 MM HG: CPT | Performed by: NURSE PRACTITIONER

## 2024-10-21 PROCEDURE — 3008F BODY MASS INDEX DOCD: CPT | Performed by: NURSE PRACTITIONER

## 2024-10-21 PROCEDURE — 1036F TOBACCO NON-USER: CPT | Performed by: NURSE PRACTITIONER

## 2024-10-21 PROCEDURE — 1160F RVW MEDS BY RX/DR IN RCRD: CPT | Performed by: NURSE PRACTITIONER

## 2024-10-21 PROCEDURE — 99213 OFFICE O/P EST LOW 20 MIN: CPT | Performed by: NURSE PRACTITIONER

## 2024-10-21 PROCEDURE — 93000 ELECTROCARDIOGRAM COMPLETE: CPT | Performed by: INTERNAL MEDICINE

## 2024-10-21 PROCEDURE — 3079F DIAST BP 80-89 MM HG: CPT | Performed by: NURSE PRACTITIONER

## 2024-10-21 NOTE — PROGRESS NOTES
"CARDIOLOGY OFFICE VISIT      CHIEF COMPLAINT  Chief Complaint   Patient presents with    Follow-up     Pt is here today following up after recent ablation    Chief complaint: \"The only way I ever knew I was out of rhythm is based on my watch and I do not believe that I have had any episodes since the ablation.\"    HISTORY OF PRESENT ILLNESS  HPI  History: The patient is a 64-year-old  male who is followed for paroxysmal atrial fibrillation on metoprolol and anticoagulated with Xarelto, hypothyroidism on replacement therapy, hypertension, and dyslipidemia on statin.  He has a history of normal coronaries per left heart catheterization dated July 21, 2022.  He presents to the office today for follow-up evaluation post PVI and atrial tachycardia ablation on September 23, 2024.  He states he is completely asymptomatic of the arrhythmia.  He reports the only way he knew he had atrial fibrillation in the past was based on his watch.  He states he has not had any arrhythmic episodes since undergoing the ablation.  He denies chest pain, palpitations, dizziness, lightheadedness, shortness of breath, abdominal distention, or lower extremity edema.  Past Medical History  Past Medical History:   Diagnosis Date    Allergic contact dermatitis due to plants, except food 10/21/2013    Contact dermatitis due to poison ivy    Arrhythmia     Cancer (Multi)     Disease of thyroid gland     Hyperlipidemia     Hypertension     Non-ischemic cardiomyopathy (Multi)     Other conditions influencing health status 04/07/2018    History of cough    Personal history of other diseases of the respiratory system 12/09/2014    History of acute sinusitis    Personal history of other diseases of the respiratory system 04/10/2015    History of acute bronchitis with bronchospasm       Social History  Social History     Tobacco Use    Smoking status: Never     Passive exposure: Past    Smokeless tobacco: Never   Vaping Use    Vaping status: " Never Used   Substance Use Topics    Alcohol use: Yes     Alcohol/week: 21.0 standard drinks of alcohol     Types: 21 Standard drinks or equivalent per week    Drug use: Not Currently       Family History     Family History   Problem Relation Name Age of Onset    Colon cancer Mother  49    Diabetes type I Father      Other (myocardial infarction) Father      Heart attack Father          Allergies:  Allergies   Allergen Reactions    Penicillins Unknown    Sulfamethoxazole Unknown        Outpatient Medications:  Current Outpatient Medications   Medication Instructions    chlorthalidone (HYGROTON) 25 mg, oral, Daily    cyanocobalamin, vitamin B-12, 1,000 mcg capsule 1 capsule, Daily    levothyroxine (SYNTHROID, LEVOXYL) 25 mcg, oral, Daily    loratadine (CLARITIN) 10 mg, Daily    losartan (COZAAR) 100 mg, oral, Daily    metoprolol succinate XL (TOPROL-XL) 25 mg, oral, Daily    rosuvastatin (CRESTOR) 40 mg, oral, Daily    Xarelto 20 mg, oral, Daily, Take in the am with food daily          REVIEW OF SYSTEMS  Review of Systems   All other systems reviewed and are negative.        VITALS  Vitals:    10/21/24 1234   BP: 118/86   Pulse: 72       PHYSICAL EXAM  Vitals and nursing note reviewed.   Constitutional:       Appearance: Normal appearance.   HENT:      Head: Normocephalic.   Neck:      Vascular: No JVD. Carotid upstrokes II/IV.  Cardiovascular:      Rate and Rhythm: Normal rate and regular rhythm.      Pulses: Normal pulses.      Heart sounds: Normal S1 S2, no S3 S4.  No murmurs or rubs.  Pulmonary:      Effort: Pulmonary effort is normal. Respirations regular and nonlabored.     Breath sounds: Clear to auscultation posterior laterally.  Abdominal:      General: Bowel sounds are normal.      Palpations: Abdomen is soft.   Musculoskeletal:         General: Normal range of motion.      Cervical back: Normal range of motion.   Skin:     General: Skin is warm and dry.   Neurological:      General: No focal deficit  present.      Mental Status: Alert and oriented to person, place, and time.      Motor: Motor function is intact.   Psychiatric:         Attention and Perception: Attention and perception normal.         Mood and Affect: Mood and affect normal.         Speech: Speech normal.         Behavior: Behavior normal. Behavior is cooperative.         Thought Content: Thought content normal.         Cognition and Memory: Cognition and memory normal.     Labs and testing: Twelve-lead EKG reveals sinus rhythm without ectopics and no acute ischemic changes.  QRS duration 94 ms,  ms, QTc 429 ms.  Left heart catheterization dated July 21, 2022 as cited above.  Holter monitor dated April 24, 2024 revealed paroxysmal atrial fibrillation with a minimum heart rate 43 bpm, average heart rate 91 bpm, maximum heart rate 138 bpm.  Rare PVCs with a burden of 0.2%.  80,000 PACs were noted as well as 2000 atrial runs with the longest being 36 beats in duration.  Longest sinus pause was 1.9 seconds.      ASSESSMENT AND PLAN    Clinical impressions:  1.  Paroxysmal atrial fibrillation controlled on beta-blockade and anticoagulated with Xarelto status post PVI and atrial tachycardia ablation originating from the portia terminalis on September 23, 2024.  2.  Nonischemic cardiomyopathy, left ventricular ejection fraction of 40% per left heart catheterization dated July 21, 2022.  3.  Hypertension, controlled blood pressure 118/86.  4.  Dyslipidemia on statin.  5.  Hypothyroidism on replacement therapy.  6.  Overweight, BMI 26.99.    Recommendations:  1.  The procedure for PVI and A. tach ablation were reviewed with the patient.  All questions were answered in detail.  2.  Continue lifestyle modifications as discussed.  Patient was encouraged to increase water consumption to 64 ounces per day and limit caffeine and alcohol consumption to 2 servings per day.  Is also encouraged to continue to exercise.  3.  Obtain a Zio patch monitor on  December 6, 2024 at 1 PM as scheduled.  4.  Follow-up in office with Dr. Taylor on February 11, 2025 at 11 AM as scheduled.  5.  The patient states that he had to adjust his follow-up appointments as he and his wife will be spending time in [place].  He will return prior to the office visit with Dr. Taylor.  6.  Follow-up in office with Dr. Larios on March 13, 2025 at 2:30 PM schedule or sooner if needed.    Evaluation and note by Gayathri Ruby, CNP  **Please excuse any errors in grammar or translation related to this dictation.  Voice recognition software was utilized to prepare this document.**

## 2024-11-13 ENCOUNTER — LAB (OUTPATIENT)
Dept: LAB | Facility: LAB | Age: 64
End: 2024-11-13
Payer: MEDICARE

## 2024-11-13 DIAGNOSIS — S22.070D COMPRESSION FRACTURE OF T10 VERTEBRA WITH ROUTINE HEALING: ICD-10-CM

## 2024-11-13 DIAGNOSIS — M80.08XA AGE-RELATED OSTEOPOROSIS WITH CURRENT PATHOLOGICAL FRACTURE, VERTEBRA(E), INITIAL ENCOUNTER FOR FRACTURE (MULTI): ICD-10-CM

## 2024-11-13 DIAGNOSIS — E03.9 HYPOTHYROIDISM, UNSPECIFIED TYPE: ICD-10-CM

## 2024-11-13 DIAGNOSIS — E78.2 MIXED HYPERLIPIDEMIA: ICD-10-CM

## 2024-11-13 DIAGNOSIS — I10 ESSENTIAL HYPERTENSION, BENIGN: ICD-10-CM

## 2024-11-13 LAB
25(OH)D3 SERPL-MCNC: 26 NG/ML (ref 30–100)
ALBUMIN SERPL BCP-MCNC: 4.7 G/DL (ref 3.4–5)
ALP SERPL-CCNC: 52 U/L (ref 33–136)
ALT SERPL W P-5'-P-CCNC: 49 U/L (ref 10–52)
ANION GAP SERPL CALC-SCNC: 14 MMOL/L (ref 10–20)
AST SERPL W P-5'-P-CCNC: 47 U/L (ref 9–39)
BILIRUB SERPL-MCNC: 0.9 MG/DL (ref 0–1.2)
BUN SERPL-MCNC: 30 MG/DL (ref 6–23)
CALCIUM SERPL-MCNC: 9.3 MG/DL (ref 8.6–10.3)
CHLORIDE SERPL-SCNC: 102 MMOL/L (ref 98–107)
CHOLEST SERPL-MCNC: 206 MG/DL (ref 0–199)
CHOLESTEROL/HDL RATIO: 3.1
CO2 SERPL-SCNC: 29 MMOL/L (ref 21–32)
CREAT SERPL-MCNC: 1.23 MG/DL (ref 0.5–1.3)
EGFRCR SERPLBLD CKD-EPI 2021: 65 ML/MIN/1.73M*2
GLUCOSE SERPL-MCNC: 104 MG/DL (ref 74–99)
HDLC SERPL-MCNC: 65.5 MG/DL
LDLC SERPL CALC-MCNC: 121 MG/DL
NON HDL CHOLESTEROL: 141 MG/DL (ref 0–149)
POTASSIUM SERPL-SCNC: 4.4 MMOL/L (ref 3.5–5.3)
PROT SERPL-MCNC: 6.5 G/DL (ref 6.4–8.2)
SODIUM SERPL-SCNC: 141 MMOL/L (ref 136–145)
T4 FREE SERPL-MCNC: 0.64 NG/DL (ref 0.61–1.12)
TRIGL SERPL-MCNC: 97 MG/DL (ref 0–149)
TSH SERPL-ACNC: 4.1 MIU/L (ref 0.44–3.98)
VLDL: 19 MG/DL (ref 0–40)

## 2024-11-13 PROCEDURE — 84439 ASSAY OF FREE THYROXINE: CPT

## 2024-11-13 PROCEDURE — 84443 ASSAY THYROID STIM HORMONE: CPT

## 2024-11-13 PROCEDURE — 80053 COMPREHEN METABOLIC PANEL: CPT

## 2024-11-13 PROCEDURE — 82306 VITAMIN D 25 HYDROXY: CPT

## 2024-11-13 PROCEDURE — 80061 LIPID PANEL: CPT

## 2024-11-13 PROCEDURE — 36415 COLL VENOUS BLD VENIPUNCTURE: CPT

## 2024-12-06 ENCOUNTER — APPOINTMENT (OUTPATIENT)
Dept: CARDIOLOGY | Facility: CLINIC | Age: 64
End: 2024-12-06
Payer: MEDICARE

## 2024-12-06 DIAGNOSIS — I48.0 PAROXYSMAL ATRIAL FIBRILLATION (MULTI): ICD-10-CM

## 2024-12-18 PROCEDURE — 93248 EXT ECG>7D<15D REV&INTERPJ: CPT | Performed by: INTERNAL MEDICINE

## 2024-12-27 ENCOUNTER — APPOINTMENT (OUTPATIENT)
Dept: CARDIOLOGY | Facility: CLINIC | Age: 64
End: 2024-12-27
Payer: MEDICARE

## 2025-01-28 ENCOUNTER — APPOINTMENT (OUTPATIENT)
Dept: CARDIOLOGY | Facility: CLINIC | Age: 65
End: 2025-01-28
Payer: MEDICARE

## 2025-02-11 ENCOUNTER — APPOINTMENT (OUTPATIENT)
Dept: CARDIOLOGY | Facility: CLINIC | Age: 65
End: 2025-02-11
Payer: MEDICARE

## 2025-02-25 ENCOUNTER — APPOINTMENT (OUTPATIENT)
Dept: SURGERY | Facility: CLINIC | Age: 65
End: 2025-02-25
Payer: MEDICARE

## 2025-02-25 VITALS
OXYGEN SATURATION: 98 % | DIASTOLIC BLOOD PRESSURE: 82 MMHG | TEMPERATURE: 97.5 F | RESPIRATION RATE: 16 BRPM | HEIGHT: 68 IN | HEART RATE: 74 BPM | SYSTOLIC BLOOD PRESSURE: 122 MMHG | BODY MASS INDEX: 28.61 KG/M2 | WEIGHT: 188.8 LBS

## 2025-02-25 DIAGNOSIS — K80.20 CALCULUS OF GALLBLADDER WITHOUT CHOLECYSTITIS WITHOUT OBSTRUCTION: ICD-10-CM

## 2025-02-25 DIAGNOSIS — K76.0 HEPATIC STEATOSIS: ICD-10-CM

## 2025-02-25 DIAGNOSIS — R79.89 ELEVATED LFTS: ICD-10-CM

## 2025-02-25 PROCEDURE — 1157F ADVNC CARE PLAN IN RCRD: CPT | Performed by: SURGERY

## 2025-02-25 PROCEDURE — 3074F SYST BP LT 130 MM HG: CPT | Performed by: SURGERY

## 2025-02-25 PROCEDURE — 1123F ACP DISCUSS/DSCN MKR DOCD: CPT | Performed by: SURGERY

## 2025-02-25 PROCEDURE — 1036F TOBACCO NON-USER: CPT | Performed by: SURGERY

## 2025-02-25 PROCEDURE — 99203 OFFICE O/P NEW LOW 30 MIN: CPT | Performed by: SURGERY

## 2025-02-25 PROCEDURE — 3079F DIAST BP 80-89 MM HG: CPT | Performed by: SURGERY

## 2025-02-25 PROCEDURE — 1159F MED LIST DOCD IN RCRD: CPT | Performed by: SURGERY

## 2025-02-25 PROCEDURE — 3008F BODY MASS INDEX DOCD: CPT | Performed by: SURGERY

## 2025-02-25 ASSESSMENT — ENCOUNTER SYMPTOMS
GASTROINTESTINAL NEGATIVE: 1
HEMATOLOGIC/LYMPHATIC NEGATIVE: 1
RESPIRATORY NEGATIVE: 1
CARDIOVASCULAR NEGATIVE: 1
ENDOCRINE NEGATIVE: 1
MUSCULOSKELETAL NEGATIVE: 1
PSYCHIATRIC NEGATIVE: 1
NEUROLOGICAL NEGATIVE: 1
ALLERGIC/IMMUNOLOGIC NEGATIVE: 1
CONSTITUTIONAL NEGATIVE: 1

## 2025-02-25 NOTE — PROGRESS NOTES
Subjective   Patient ID: Norris Espinal is a 64 y.o. male who presents for New Patient Visit (NPV- Gallbladder/ Stones ).  Patient referred to our clinic due to incidental gallstones. Reports last fall sustaining some rib fractures and work-up included a CT which showed gallstones. Denies ever having abdominal pain, bloating, nausea, or intolerance of fatty foods. Denies yellow discoloration of skin or eyes. Denies changes to bowel function.         Review of Systems   Constitutional: Negative.    HENT: Negative.     Respiratory: Negative.     Cardiovascular: Negative.    Gastrointestinal: Negative.    Endocrine: Negative.    Genitourinary: Negative.    Musculoskeletal: Negative.    Skin: Negative.    Allergic/Immunologic: Negative.    Neurological: Negative.    Hematological: Negative.    Psychiatric/Behavioral: Negative.         Objective   Physical Exam  Constitutional:       Appearance: Normal appearance.   HENT:      Head: Normocephalic and atraumatic.   Cardiovascular:      Rate and Rhythm: Normal rate and regular rhythm.   Pulmonary:      Effort: Pulmonary effort is normal.      Breath sounds: Normal breath sounds.   Abdominal:      General: Abdomen is flat. There is no distension.      Palpations: Abdomen is soft.      Tenderness: There is no abdominal tenderness.      Comments: - Alvarado's sign.    Musculoskeletal:         General: Normal range of motion.   Skin:     General: Skin is warm and dry.   Neurological:      General: No focal deficit present.      Mental Status: He is alert and oriented to person, place, and time.   Psychiatric:         Mood and Affect: Mood normal.         Behavior: Behavior normal.         Assessment/Plan   Diagnoses and all orders for this visit:  Calculus of gallbladder without cholecystitis without obstruction  -     Discussed natural history of calculus biliary diseases including biliary colic, cholecystitis, and choledocholithiasis. Discussed that asymptomatic gallstones is  not an indication for surgery.     Reviewed signs/symptoms related to gallstones. Advised patient to call back if these should occur. Patient expressed understanding.          Homero Ridley MD   General Surgery   02/25/25 11:33 AM

## 2025-03-07 DIAGNOSIS — I10 ESSENTIAL HYPERTENSION, BENIGN: ICD-10-CM

## 2025-03-07 RX ORDER — CHLORTHALIDONE 25 MG/1
25 TABLET ORAL DAILY
Qty: 90 TABLET | Refills: 0 | Status: SHIPPED | OUTPATIENT
Start: 2025-03-07

## 2025-03-13 ENCOUNTER — APPOINTMENT (OUTPATIENT)
Dept: CARDIOLOGY | Facility: CLINIC | Age: 65
End: 2025-03-13
Payer: MEDICARE

## 2025-03-13 VITALS
HEART RATE: 67 BPM | WEIGHT: 192 LBS | HEIGHT: 68 IN | BODY MASS INDEX: 29.1 KG/M2 | SYSTOLIC BLOOD PRESSURE: 123 MMHG | DIASTOLIC BLOOD PRESSURE: 81 MMHG

## 2025-03-13 DIAGNOSIS — I10 ESSENTIAL HYPERTENSION, BENIGN: ICD-10-CM

## 2025-03-13 DIAGNOSIS — E78.2 MIXED HYPERLIPIDEMIA: ICD-10-CM

## 2025-03-13 DIAGNOSIS — Z78.9 NEVER SMOKED TOBACCO: ICD-10-CM

## 2025-03-13 DIAGNOSIS — I48.91 ATRIAL FIBRILLATION, UNSPECIFIED TYPE (MULTI): ICD-10-CM

## 2025-03-13 DIAGNOSIS — E66.3 OVERWEIGHT (BMI 25.0-29.9): ICD-10-CM

## 2025-03-13 DIAGNOSIS — I42.9 CARDIOMYOPATHY, UNSPECIFIED TYPE (MULTI): ICD-10-CM

## 2025-03-13 PROCEDURE — 1036F TOBACCO NON-USER: CPT | Performed by: INTERNAL MEDICINE

## 2025-03-13 PROCEDURE — 1157F ADVNC CARE PLAN IN RCRD: CPT | Performed by: INTERNAL MEDICINE

## 2025-03-13 PROCEDURE — 3074F SYST BP LT 130 MM HG: CPT | Performed by: INTERNAL MEDICINE

## 2025-03-13 PROCEDURE — 3008F BODY MASS INDEX DOCD: CPT | Performed by: INTERNAL MEDICINE

## 2025-03-13 PROCEDURE — 3079F DIAST BP 80-89 MM HG: CPT | Performed by: INTERNAL MEDICINE

## 2025-03-13 PROCEDURE — 99214 OFFICE O/P EST MOD 30 MIN: CPT | Performed by: INTERNAL MEDICINE

## 2025-03-13 PROCEDURE — 1123F ACP DISCUSS/DSCN MKR DOCD: CPT | Performed by: INTERNAL MEDICINE

## 2025-03-13 ASSESSMENT — ENCOUNTER SYMPTOMS
RESPIRATORY NEGATIVE: 1
NEUROLOGICAL NEGATIVE: 1
LIGHT-HEADEDNESS: 0
CARDIOVASCULAR NEGATIVE: 1
CONSTITUTIONAL NEGATIVE: 1
SHORTNESS OF BREATH: 0

## 2025-03-13 NOTE — PROGRESS NOTES
CARDIOLOGY OFFICE VISIT      CHIEF COMPLAINT  Chief Complaint   Patient presents with    Follow-up    Cardiomyopathy    Hypertension        HISTORY OF PRESENT ILLNESS  Norris Espinal is a 64 y.o. year old male patient with paroxysmal atrial fibrillation with frequent breakthrough episodes for which he underwent ablation he has been doing well.  Follow-up 7 days Ziopatch from December 2024 shows underlying sinus rhythm with no episodes of atrial fibrillation, rare PVCs less than 1% and rare PACs also less than 1%.  There were 3 episodes of short nonsustained SVT lasting about 20 beats at the longest which was asymptomatic.  This is a significant improvement compared to his prior Holter which showed episodes of A-fib and frequent PACs about 30%.  He had a normal cardiac catheterization in 2022 and echo shows mild LV dysfunction with EF of 45%.    He denies any recurrent palpitations chest pain presyncope or syncope.    ASSESSMENT AND PLAN  1.  Paroxysmal atrial fibrillation, status post A-fib ablation as noted above with no further recurrences on follow-up Zio patch monitoring.  He continues with Xarelto for anticoagulation and has follow-up appointment with Dr. Taylor.  2.  Hypertension: Blood pressure is well-controlled, will continue with losartan and metoprolol as well as chlorthalidone for optimal blood pressure control.  3.  Dyslipidemia: I reviewed his labs from November 2024 which showed an LDL of 121, we will continue with Crestor at 40 mg daily    Problem List Items Addressed This Visit             ICD-10-CM    Cardiomyopathy I42.9    Essential hypertension, benign I10    Mixed hyperlipidemia E78.2    Atrial fibrillation (Multi) I48.91    Overweight (BMI 25.0-29.9) E66.3    Never smoked tobacco Z78.9       Recent Cardiovascular Testing:    Echo-8/23/2023   CONCLUSIONS:  1. Poorly visualized anatomical structures due to suboptimal image quality.  2. Left ventricular systolic function was not well  visualized.  3. Poorly visualized LV cavity makes it difficult to estimate LVEF. However, it appears to be mildly reduced.     Stress-6/17/2022     IMPRESSION:  Abnormal Lexiscan Myoview cardiac perfusion stress test.  No myocardial ischemia by perfusion imaging.  No myocardial infarction by perfusion imaging.  Abnormal left ventricular systolic function.  Left ventricular ejection fraction 40 %.  There are no prior studies available for comparison  Patient with good of functional capacity at 12.1 Mets.    Cath-7/21/2022   CONCLUSIONS:   1. The entire Left Main: 0% stenosis.   2. Entire LAD Lesion: The percent stenosis is 0%.   3. Entire CX Lesion: The percent stenosis is 0%.   4. The entire RCA Lesion: The percent stenosis is 0%.   5. The Left Ventricular Ejection Fraction is 40%, by echo.     Carotid Ultrasound-    Past Medical History  Past Medical History:   Diagnosis Date    Allergic contact dermatitis due to plants, except food 10/21/2013    Contact dermatitis due to poison ivy    Arrhythmia     Cancer (Multi)     Disease of thyroid gland     Hyperlipidemia     Hypertension     Non-ischemic cardiomyopathy (Multi)     Other conditions influencing health status 04/07/2018    History of cough    Personal history of other diseases of the respiratory system 12/09/2014    History of acute sinusitis    Personal history of other diseases of the respiratory system 04/10/2015    History of acute bronchitis with bronchospasm       Social History  Social History     Tobacco Use    Smoking status: Never     Passive exposure: Past    Smokeless tobacco: Never   Vaping Use    Vaping status: Never Used   Substance Use Topics    Alcohol use: Yes     Alcohol/week: 21.0 standard drinks of alcohol     Types: 21 Standard drinks or equivalent per week    Drug use: Not Currently       Family History     Family History   Problem Relation Name Age of Onset    Colon cancer Mother  49    Diabetes type I Father      Other (myocardial  "infarction) Father      Heart attack Father          Allergies:  Allergies   Allergen Reactions    Penicillins Unknown    Sulfamethoxazole Unknown        Outpatient Medications:  Current Outpatient Medications   Medication Instructions    chlorthalidone (HYGROTON) 25 mg, oral, Daily    cyanocobalamin, vitamin B-12, 1,000 mcg capsule 1 capsule, Daily    levothyroxine (SYNTHROID, LEVOXYL) 25 mcg, oral, Daily    loratadine (CLARITIN) 10 mg, Daily    losartan (COZAAR) 100 mg, oral, Daily    metoprolol succinate XL (TOPROL-XL) 25 mg, oral, Daily    rosuvastatin (CRESTOR) 40 mg, oral, Daily    Xarelto 20 mg, oral, Daily, Take in the am with food daily        Recent Lab Results:    CBC:   Lab Results   Component Value Date    WBC 5.0 09/23/2024    RBC 4.16 (L) 09/23/2024    HGB 14.7 09/23/2024    HCT 40.8 (L) 09/23/2024     (L) 09/23/2024        CMP:    Lab Results   Component Value Date     11/13/2024    K 4.4 11/13/2024     11/13/2024    CO2 29 11/13/2024    BUN 30 (H) 11/13/2024    CREATININE 1.23 11/13/2024    GLUCOSE 104 (H) 11/13/2024    CALCIUM 9.3 11/13/2024       Magnesium:    Lab Results   Component Value Date    MG 2.12 09/09/2023       Lipid Profile:    Lab Results   Component Value Date    TRIG 97 11/13/2024    HDL 65.5 11/13/2024    LDLCALC 121 (H) 11/13/2024       TSH:    Lab Results   Component Value Date    TSH 4.10 (H) 11/13/2024       BNP:   No results found for: \"BNP\"     PT/INR:    Lab Results   Component Value Date    PROTIME 12.3 09/23/2024    INR 1.1 09/23/2024       HgBA1c:    Lab Results   Component Value Date    HGBA1C 4.7 06/08/2022       BMP:  Lab Results   Component Value Date     11/13/2024     09/23/2024     04/29/2024    K 4.4 11/13/2024    K 4.0 09/23/2024    K 4.2 04/29/2024     11/13/2024     09/23/2024     04/29/2024    CO2 29 11/13/2024    CO2 26 09/23/2024    CO2 27 04/29/2024    BUN 30 (H) 11/13/2024    BUN 23 09/23/2024    BUN " "20 04/29/2024    CREATININE 1.23 11/13/2024    CREATININE 0.98 09/23/2024    CREATININE 0.99 04/29/2024       CBC:  Lab Results   Component Value Date    WBC 5.0 09/23/2024    WBC 5.9 04/29/2024    WBC 7.2 09/09/2023    WBC CANCELED 09/08/2023    WBC 8.2 09/08/2023    RBC 4.16 (L) 09/23/2024    RBC 4.28 (L) 04/29/2024    RBC 3.89 (L) 09/09/2023    RBC CANCELED 09/08/2023    RBC 4.51 09/08/2023    HGB 14.7 09/23/2024    HGB 14.9 04/29/2024    HGB 13.5 09/09/2023    HGB CANCELED 09/08/2023    HGB 15.3 09/08/2023    HCT 40.8 (L) 09/23/2024    HCT 41.9 04/29/2024    HCT 39.8 (L) 09/09/2023    HCT CANCELED 09/08/2023    HCT 44.2 09/08/2023    MCV 98 09/23/2024    MCV 98 04/29/2024     (H) 09/09/2023    MCV CANCELED 09/08/2023    MCV 98 09/08/2023    MCH 34.8 (H) 04/29/2024    MCHC 36.0 09/23/2024    MCHC 35.6 04/29/2024    MCHC 33.9 09/09/2023    MCHC CANCELED 09/08/2023    MCHC 34.6 09/08/2023    RDW 12.4 09/23/2024    RDW 12.7 04/29/2024    RDW 13.0 09/09/2023    RDW CANCELED 09/08/2023    RDW 12.9 09/08/2023     (L) 09/23/2024     04/29/2024     (L) 09/09/2023    PLT CANCELED 09/08/2023     09/08/2023    MPV 10.4 09/23/2024       Cardiac Enzymes:    No results found for: \"TROPHS\"    Hepatic Function Panel:    Lab Results   Component Value Date    ALKPHOS 52 11/13/2024    ALT 49 11/13/2024    AST 47 (H) 11/13/2024    PROT 6.5 11/13/2024    BILITOT 0.9 11/13/2024         REVIEW OF SYSTEMS  Review of Systems   Constitutional: Negative.   Cardiovascular: Negative.  Negative for chest pain.   Respiratory: Negative.  Negative for shortness of breath.    Neurological: Negative.  Negative for light-headedness.       VITALS  Vitals:    03/13/25 1438   BP: 123/81   Pulse: 67     Wt Readings from Last 4 Encounters:   03/13/25 87.1 kg (192 lb)   02/25/25 85.6 kg (188 lb 12.8 oz)   10/21/24 80.5 kg (177 lb 8 oz)   10/10/24 85.3 kg (188 lb)       PHYSICAL EXAM  Constitutional:       Appearance: " Healthy appearance.      Comments: overweight   Neck:      Thyroid: Thyroid normal.      Vascular: JVD normal.   Pulmonary:      Effort: Pulmonary effort is normal.      Breath sounds: Normal breath sounds.   Cardiovascular:      PMI at left midclavicular line. Normal rate. Regular rhythm.      Murmurs: There is no murmur.   Edema:     Peripheral edema absent.   Musculoskeletal: Normal range of motion.      Cervical back: Normal range of motion. Skin:     General: Skin is warm and dry.   Neurological:      General: No focal deficit present.      Mental Status: Alert and oriented to person, place and time.         Scribe Attestation  By signing my name below, Elva LOVE LPN   , Scribe   attest that this documentation has been prepared under the direction and in the presence of Eliu Larios MD.

## 2025-03-19 ENCOUNTER — OFFICE VISIT (OUTPATIENT)
Dept: CARDIOLOGY | Facility: CLINIC | Age: 65
End: 2025-03-19
Payer: MEDICARE

## 2025-03-19 VITALS
BODY MASS INDEX: 28.95 KG/M2 | WEIGHT: 191 LBS | DIASTOLIC BLOOD PRESSURE: 90 MMHG | HEIGHT: 68 IN | TEMPERATURE: 97.8 F | SYSTOLIC BLOOD PRESSURE: 135 MMHG | HEART RATE: 60 BPM | OXYGEN SATURATION: 96 %

## 2025-03-19 DIAGNOSIS — I48.91 ATRIAL FIBRILLATION, UNSPECIFIED TYPE (MULTI): Primary | ICD-10-CM

## 2025-03-19 DIAGNOSIS — R94.31 ABNORMAL ELECTROCARDIOGRAM (ECG) (EKG): ICD-10-CM

## 2025-03-19 PROCEDURE — 1159F MED LIST DOCD IN RCRD: CPT | Performed by: INTERNAL MEDICINE

## 2025-03-19 PROCEDURE — 3075F SYST BP GE 130 - 139MM HG: CPT | Performed by: INTERNAL MEDICINE

## 2025-03-19 PROCEDURE — 3008F BODY MASS INDEX DOCD: CPT | Performed by: INTERNAL MEDICINE

## 2025-03-19 PROCEDURE — 99213 OFFICE O/P EST LOW 20 MIN: CPT | Performed by: INTERNAL MEDICINE

## 2025-03-19 PROCEDURE — 93010 ELECTROCARDIOGRAM REPORT: CPT | Performed by: INTERNAL MEDICINE

## 2025-03-19 PROCEDURE — 1123F ACP DISCUSS/DSCN MKR DOCD: CPT | Performed by: INTERNAL MEDICINE

## 2025-03-19 PROCEDURE — 93005 ELECTROCARDIOGRAM TRACING: CPT | Performed by: INTERNAL MEDICINE

## 2025-03-19 PROCEDURE — 3080F DIAST BP >= 90 MM HG: CPT | Performed by: INTERNAL MEDICINE

## 2025-03-19 PROCEDURE — 1157F ADVNC CARE PLAN IN RCRD: CPT | Performed by: INTERNAL MEDICINE

## 2025-03-19 PROCEDURE — 99213 OFFICE O/P EST LOW 20 MIN: CPT | Mod: 25 | Performed by: INTERNAL MEDICINE

## 2025-03-19 NOTE — PROGRESS NOTES
Referring Provider: Emanuel Gutierrez MD  Reason for Consult: Atrial fibrillation    History of Present Illness:      Norris Espinal is a 64 y.o. year old male patient with a history significant for nonischemic cardiomyopathy, paroxysmal atrial fibrillation s.p ablation 9/2024, frequent PACs, hypertension, hyperlipidemia, and congenital vertebral malformation who is referred by Dr. Gutierrez for consideration of A-fib ablation.    He was initially diagnosed with paroxysmal atrial fibrillation a few years ago after it was caught on a twelve-lead EKG.  Subsequent workup included a nuclear stress test, which showed a depressed ejection fraction and no ischemia, followed by cardiac catheterization which confirmed the absence of coronary artery disease.  He is paroxysmal and has not required cardioversions.  He wore a cardiac event monitor in April 2024 which showed a 30% burden of PACs and frequent runs of atrial tachycardia up to 36 beats in duration, as well as 1 minute of atrial fibrillation.  He is asymptomatic during these episodes.    He underwent successful ablation on 9/23/2024.  PVI was performed.  An atrial tachycardia arising from the portia terminalis was also ablated.  He has done well since the ablation without any recurrent arrhythmias.  3-month monitor post ablation off all antiarrhythmic showed no recurrent arrhythmias.    Focused Cardiovascular Problem List:  Paroxysmal atrial fibrillation status post PVI: [id number] equals 3.  On metoprolol and Xarelto.  Frequent PACs and atrial tachycardia status post ablation at the portia terminalis  Nonischemic cardiomyopathy (LVEF 40%)  Hypertension  Hyperlipidemia      Past Medical and Surgical History:  Mr. Espinal  has a past medical history of Allergic contact dermatitis due to plants, except food (10/21/2013), Arrhythmia, Cancer (Multi), Disease of thyroid gland, Hyperlipidemia, Hypertension, Non-ischemic cardiomyopathy (Multi), Other conditions  influencing health status (04/07/2018), Personal history of other diseases of the respiratory system (12/09/2014), and Personal history of other diseases of the respiratory system (04/10/2015).    has a past surgical history that includes Other surgical history (07/26/2022); Other surgical history (06/03/2022); Other surgical history (06/22/2022); Pleura biopsy; Lung decortication (09/09/2023); and Cardiac electrophysiology procedure (N/A, 9/23/2024).    Social History:  Social History     Tobacco Use    Smoking status: Never     Passive exposure: Past    Smokeless tobacco: Never   Substance Use Topics    Alcohol use: Yes     Alcohol/week: 21.0 standard drinks of alcohol     Types: 21 Standard drinks or equivalent per week      Tobacco: Denies  Alcohol:  3 drinks / day  Drug use:  Denies  Occupational History: Retired      Relevant Family History:   Family History   Problem Relation Name Age of Onset    Colon cancer Mother  49    Diabetes type I Father      Other (myocardial infarction) Father      Heart attack Father            Allergies:  Allergies   Allergen Reactions    Penicillins Unknown    Sulfamethoxazole Unknown        Medications:  Current Outpatient Medications   Medication Instructions    chlorthalidone (HYGROTON) 25 mg, oral, Daily    cyanocobalamin, vitamin B-12, 1,000 mcg capsule 1 capsule, Daily    levothyroxine (SYNTHROID, LEVOXYL) 25 mcg, oral, Daily    loratadine (CLARITIN) 10 mg, Daily    losartan (COZAAR) 100 mg, oral, Daily    metoprolol succinate XL (TOPROL-XL) 25 mg, oral, Daily    rosuvastatin (CRESTOR) 40 mg, oral, Daily    Xarelto 20 mg, oral, Daily, Take in the am with food daily         Objective   Physical Exam:  Last Recorded Vitals:      9/23/2024     1:30 PM 9/23/2024     2:30 PM 10/10/2024    11:29 AM 10/21/2024    12:34 PM 2/25/2025    10:19 AM 3/13/2025     2:38 PM 3/19/2025    11:24 AM   Vitals   Systolic 138 140 138 118 122 123 135   Diastolic 87 93 90 86 82 81 90  "  BP Location   Left arm Left arm Right arm Left arm    Heart Rate 80 74 66 72 74 67 60   Temp   36.2 °C (97.2 °F)  36.4 °C (97.5 °F)  36.6 °C (97.8 °F)   Resp 18 18   16     Height   1.727 m (5' 8\") 1.727 m (5' 8\") 1.727 m (5' 8\") 1.727 m (5' 8\") 1.727 m (5' 8\")   Weight (lb)   188 177.5 188.8 192 191   BMI   28.59 kg/m2 26.99 kg/m2 28.71 kg/m2 29.19 kg/m2 29.04 kg/m2   BSA (m2)   2.02 m2 1.97 m2 2.03 m2 2.04 m2 2.04 m2   Visit Report   Report Report Report Report Report    Visit Vitals  /90   Pulse 60   Temp 36.6 °C (97.8 °F)   Ht 1.727 m (5' 8\")   Wt 86.6 kg (191 lb)   SpO2 96%   BMI 29.04 kg/m²   Smoking Status Never   BSA 2.04 m²      Gen: NAD, sitting comfortably  HEENT: NC/AT  Card: Irregular rhythm, regular rate, no m/r/g  Pulm: Clear to auscultation bilaterally  Ext: No LE edema  Neuro: No focal deficits    Diagnostic Results      My Interpretation of Reviewed Study(s):  Prior ECGs (reviewed and my interpretation):   3/19/2025: Normal sinus rhythm, normal ECG  7/31/2024: Sinus rhythm with frequent PACs, left anterior fascicular block      Echocardiography:  8/2023  CONCLUSIONS:  1. Poorly visualized anatomical structures due to suboptimal image quality.  2. Left ventricular systolic function was not well visualized.  3. Poorly visualized LV cavity makes it difficult to estimate LVEF. However, it appears to be mildly reduced.      Cardiac Catheterization:   7/2022: No coronary artery disease    Cardiac Monitors:  12/6/2024: Normal 7-day monitor showing no clinically significant arrhythmia         Relevant Labs:  Lab Results   Component Value Date    CREATININE 1.23 11/13/2024    CREATININE 0.98 09/23/2024    CREATININE 0.99 04/29/2024    K 4.4 11/13/2024    K 4.0 09/23/2024    K 4.2 04/29/2024    HGBA1C 4.7 06/08/2022    HGB 14.7 09/23/2024    HGB 14.9 04/29/2024    HGB 13.5 09/09/2023    INR 1.1 09/23/2024    INR 1.0 09/09/2023    INR 1.1 09/08/2023    AST 47 (H) 11/13/2024    AST 36 09/23/2024    AST " 41 (H) 04/29/2024    ALT 49 11/13/2024    ALT 40 09/23/2024    ALT 45 04/29/2024       Assessment/Plan   Assessment and Plan:  In summary, patient is a pleasant 64 y.o.male with a history of recurrent atrial fibrillation and frequent PVCs in the setting of nonischemic cardiomyopathy diagnosed in 2022, has been maintained on metoprolol, who presents today for follow-up after ablation in September 2024.  He has done well since the ablation with no recurrent arrhythmias.  Monitor which was performed 3 months after the ablation showed no recurrent arrhythmias.  He has not felt any palpitations or recurrence of his atrial fibrillation.  His frequent PVCs have also subsided.    Recommendations:  - Continue Xarelto 20 mg daily indefinitely given his [id number]-[id number] score of 3  - Continue metoprolol XL 25 mg daily  - Repeat echocardiogram to see if his nonischemic cardiomyopathy has improved post ablation  - Repeat 7-day Zio patch monitor 1 year out from ablation (9/2025)             Return to Clinic:  In 7 months    Thank you very much for allowing me to participate in the care of this patient. Please do not hesitate to contact me with any further questions or concerns.    Lizzie Taylor MD  Clinical Cardiac Electrophysiologist, [facility] [place]    of Medicine, [employer] [facility] [employer]  Director of Atrial Fibrillation Ablation, [facility]  Director of Ventricular Arrhythmias Research, [facility]  Office Phone Number: [phone]

## 2025-04-10 ENCOUNTER — APPOINTMENT (OUTPATIENT)
Dept: PRIMARY CARE | Facility: CLINIC | Age: 65
End: 2025-04-10
Payer: MEDICARE

## 2025-04-14 DIAGNOSIS — I48.91 ATRIAL FIBRILLATION, UNSPECIFIED TYPE (MULTI): ICD-10-CM

## 2025-04-16 RX ORDER — RIVAROXABAN 20 MG/1
TABLET, FILM COATED ORAL
Qty: 90 TABLET | Refills: 1 | Status: SHIPPED | OUTPATIENT
Start: 2025-04-16

## 2025-04-29 DIAGNOSIS — E78.2 MIXED HYPERLIPIDEMIA: ICD-10-CM

## 2025-05-01 RX ORDER — ROSUVASTATIN CALCIUM 40 MG/1
40 TABLET, COATED ORAL DAILY
Qty: 90 TABLET | Refills: 1 | Status: SHIPPED | OUTPATIENT
Start: 2025-05-01

## 2025-05-03 DIAGNOSIS — I10 ESSENTIAL (PRIMARY) HYPERTENSION: ICD-10-CM

## 2025-05-06 ENCOUNTER — TELEPHONE (OUTPATIENT)
Dept: CARDIOLOGY | Facility: CLINIC | Age: 65
End: 2025-05-06
Payer: MEDICARE

## 2025-05-06 DIAGNOSIS — Z12.5 SCREENING PSA (PROSTATE SPECIFIC ANTIGEN): Primary | ICD-10-CM

## 2025-05-06 DIAGNOSIS — E03.9 HYPOTHYROIDISM, UNSPECIFIED TYPE: ICD-10-CM

## 2025-05-06 DIAGNOSIS — E55.9 VITAMIN D DEFICIENCY: ICD-10-CM

## 2025-05-06 DIAGNOSIS — E78.2 MIXED HYPERLIPIDEMIA: ICD-10-CM

## 2025-05-06 DIAGNOSIS — R73.01 IFG (IMPAIRED FASTING GLUCOSE): ICD-10-CM

## 2025-05-06 DIAGNOSIS — K21.00 GASTROESOPHAGEAL REFLUX DISEASE WITH ESOPHAGITIS WITHOUT HEMORRHAGE: ICD-10-CM

## 2025-05-06 RX ORDER — LOSARTAN POTASSIUM 100 MG/1
100 TABLET ORAL DAILY
Qty: 90 TABLET | Refills: 1 | Status: SHIPPED | OUTPATIENT
Start: 2025-05-06

## 2025-05-06 NOTE — TELEPHONE ENCOUNTER
Patient left vmail stating he is coming in next Monday for an appt and wants to know if he should have labs done before? Please advise.

## 2025-05-10 LAB
25(OH)D3+25(OH)D2 SERPL-MCNC: 37 NG/ML (ref 30–100)
ALBUMIN SERPL-MCNC: 4.8 G/DL (ref 3.6–5.1)
ALP SERPL-CCNC: 59 U/L (ref 35–144)
ALT SERPL-CCNC: 30 U/L (ref 9–46)
ANION GAP SERPL CALCULATED.4IONS-SCNC: 11 MMOL/L (CALC) (ref 7–17)
AST SERPL-CCNC: 37 U/L (ref 10–35)
BASOPHILS # BLD AUTO: 42 CELLS/UL (ref 0–200)
BASOPHILS NFR BLD AUTO: 0.7 %
BILIRUB SERPL-MCNC: 0.8 MG/DL (ref 0.2–1.2)
BUN SERPL-MCNC: 29 MG/DL (ref 7–25)
CALCIUM SERPL-MCNC: 9.6 MG/DL (ref 8.6–10.3)
CHLORIDE SERPL-SCNC: 103 MMOL/L (ref 98–110)
CO2 SERPL-SCNC: 27 MMOL/L (ref 20–32)
CREAT SERPL-MCNC: 1.01 MG/DL (ref 0.7–1.35)
EGFRCR SERPLBLD CKD-EPI 2021: 82 ML/MIN/1.73M2
EOSINOPHIL # BLD AUTO: 108 CELLS/UL (ref 15–500)
EOSINOPHIL NFR BLD AUTO: 1.8 %
ERYTHROCYTE [DISTWIDTH] IN BLOOD BY AUTOMATED COUNT: 12.5 % (ref 11–15)
EST. AVERAGE GLUCOSE BLD GHB EST-MCNC: 103 MG/DL
EST. AVERAGE GLUCOSE BLD GHB EST-SCNC: 5.7 MMOL/L
GLUCOSE SERPL-MCNC: 109 MG/DL (ref 65–99)
HBA1C MFR BLD: 5.2 %
HCT VFR BLD AUTO: 41.4 % (ref 38.5–50)
HGB BLD-MCNC: 14.1 G/DL (ref 13.2–17.1)
LYMPHOCYTES # BLD AUTO: 1752 CELLS/UL (ref 850–3900)
LYMPHOCYTES NFR BLD AUTO: 29.2 %
MCH RBC QN AUTO: 35.3 PG (ref 27–33)
MCHC RBC AUTO-ENTMCNC: 34.1 G/DL (ref 32–36)
MCV RBC AUTO: 103.8 FL (ref 80–100)
MONOCYTES # BLD AUTO: 624 CELLS/UL (ref 200–950)
MONOCYTES NFR BLD AUTO: 10.4 %
NEUTROPHILS # BLD AUTO: 3474 CELLS/UL (ref 1500–7800)
NEUTROPHILS NFR BLD AUTO: 57.9 %
PLATELET # BLD AUTO: 150 THOUSAND/UL (ref 140–400)
PMV BLD REES-ECKER: 9.8 FL (ref 7.5–12.5)
POTASSIUM SERPL-SCNC: 4.5 MMOL/L (ref 3.5–5.3)
PROT SERPL-MCNC: 6.5 G/DL (ref 6.1–8.1)
PSA SERPL-MCNC: 0.7 NG/ML
RBC # BLD AUTO: 3.99 MILLION/UL (ref 4.2–5.8)
SODIUM SERPL-SCNC: 141 MMOL/L (ref 135–146)
TSH SERPL-ACNC: 3.25 MIU/L (ref 0.4–4.5)
WBC # BLD AUTO: 6 THOUSAND/UL (ref 3.8–10.8)

## 2025-05-12 ENCOUNTER — APPOINTMENT (OUTPATIENT)
Dept: PRIMARY CARE | Facility: CLINIC | Age: 65
End: 2025-05-12
Payer: MEDICARE

## 2025-05-12 VITALS
BODY MASS INDEX: 28.95 KG/M2 | HEART RATE: 71 BPM | DIASTOLIC BLOOD PRESSURE: 87 MMHG | TEMPERATURE: 97.3 F | HEIGHT: 68 IN | WEIGHT: 191 LBS | SYSTOLIC BLOOD PRESSURE: 128 MMHG

## 2025-05-12 DIAGNOSIS — R79.89 ELEVATED LFTS: ICD-10-CM

## 2025-05-12 DIAGNOSIS — D75.89 MACROCYTOSIS: ICD-10-CM

## 2025-05-12 DIAGNOSIS — E03.9 HYPOTHYROIDISM, UNSPECIFIED TYPE: ICD-10-CM

## 2025-05-12 DIAGNOSIS — Z00.00 ROUTINE GENERAL MEDICAL EXAMINATION AT HEALTH CARE FACILITY: Primary | ICD-10-CM

## 2025-05-12 DIAGNOSIS — I10 ESSENTIAL HYPERTENSION, BENIGN: ICD-10-CM

## 2025-05-12 DIAGNOSIS — Z13.5 SCREENING FOR EYE CONDITION: ICD-10-CM

## 2025-05-12 DIAGNOSIS — E78.2 MIXED HYPERLIPIDEMIA: ICD-10-CM

## 2025-05-12 DIAGNOSIS — Z23 NEED FOR VACCINATION: ICD-10-CM
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
## 2025-05-12 PROCEDURE — G2211 COMPLEX E/M VISIT ADD ON: HCPCS | Performed by: FAMILY MEDICINE

## 2025-05-12 PROCEDURE — 1158F ADVNC CARE PLAN TLK DOCD: CPT | Performed by: FAMILY MEDICINE

## 2025-05-12 PROCEDURE — 99214 OFFICE O/P EST MOD 30 MIN: CPT | Performed by: FAMILY MEDICINE

## 2025-05-12 PROCEDURE — 3074F SYST BP LT 130 MM HG: CPT | Performed by: FAMILY MEDICINE

## 2025-05-12 PROCEDURE — 3008F BODY MASS INDEX DOCD: CPT | Performed by: FAMILY MEDICINE

## 2025-05-12 PROCEDURE — 3079F DIAST BP 80-89 MM HG: CPT | Performed by: FAMILY MEDICINE

## 2025-05-12 PROCEDURE — 1159F MED LIST DOCD IN RCRD: CPT | Performed by: FAMILY MEDICINE

## 2025-05-12 PROCEDURE — G0439 PPPS, SUBSEQ VISIT: HCPCS | Performed by: FAMILY MEDICINE

## 2025-05-12 PROCEDURE — 1170F FXNL STATUS ASSESSED: CPT | Performed by: FAMILY MEDICINE

## 2025-05-12 RX ORDER — LEVOTHYROXINE SODIUM 25 UG/1
25 TABLET ORAL DAILY
Qty: 90 TABLET | Refills: 1 | Status: SHIPPED | OUTPATIENT
Start: 2025-05-12 | End: 2026-05-12

## 2025-05-12 ASSESSMENT — ACTIVITIES OF DAILY LIVING (ADL)
TAKING_MEDICATION: INDEPENDENT
BATHING: INDEPENDENT
DRESSING: INDEPENDENT
MANAGING_FINANCES: INDEPENDENT
GROCERY_SHOPPING: INDEPENDENT
DOING_HOUSEWORK: INDEPENDENT

## 2025-05-12 ASSESSMENT — LIFESTYLE VARIABLES
HOW OFTEN DURING THE LAST YEAR HAVE YOU NEEDED AN ALCOHOLIC DRINK FIRST THING IN THE MORNING TO GET YOURSELF GOING AFTER A NIGHT OF HEAVY DRINKING: NEVER
HOW OFTEN DURING THE LAST YEAR HAVE YOU FAILED TO DO WHAT WAS NORMALLY EXPECTED FROM YOU BECAUSE OF DRINKING: NEVER
HOW OFTEN DURING THE LAST YEAR HAVE YOU HAD A FEELING OF GUILT OR REMORSE AFTER DRINKING: NEVER
HOW MANY STANDARD DRINKS CONTAINING ALCOHOL DO YOU HAVE ON A TYPICAL DAY: 3 OR 4
HOW OFTEN DO YOU HAVE SIX OR MORE DRINKS ON ONE OCCASION: NEVER
AUDIT-C TOTAL SCORE: 5
AUDIT TOTAL SCORE: 10
HOW OFTEN DO YOU HAVE A DRINK CONTAINING ALCOHOL: 4 OR MORE TIMES A WEEK
HAS A RELATIVE, FRIEND, DOCTOR, OR ANOTHER HEALTH PROFESSIONAL EXPRESSED CONCERN ABOUT YOUR DRINKING OR SUGGESTED YOU CUT DOWN: YES, DURING THE LAST YEAR
SKIP TO QUESTIONS 9-10: 0
HOW OFTEN DURING THE LAST YEAR HAVE YOU BEEN UNABLE TO REMEMBER WHAT HAPPENED THE NIGHT BEFORE BECAUSE YOU HAD BEEN DRINKING: NEVER
HAVE YOU OR SOMEONE ELSE BEEN INJURED AS A RESULT OF YOUR DRINKING: NO
HOW OFTEN DURING THE LAST YEAR HAVE YOU FOUND THAT YOU WERE NOT ABLE TO STOP DRINKING ONCE YOU HAD STARTED: LESS THAN MONTHLY

## 2025-05-12 NOTE — ASSESSMENT & PLAN NOTE
Continue rosuvastatin.  Proceed with lipid panel as previously ordered by his cardiologist.  We discussed potentially may need additional agent such as ezetimibe if LDL remains above goal control.  Orders:    Comprehensive metabolic panel; Future

## 2025-05-12 NOTE — ASSESSMENT & PLAN NOTE
Continue levothyroxine, adjust dose if needed based on follow-up TSH.  Orders:    TSH with reflex to Free T4 if abnormal; Future    levothyroxine (Synthroid, Levoxyl) 25 mcg tablet; Take 1 tablet (25 mcg) by mouth once daily.

## 2025-05-12 NOTE — ASSESSMENT & PLAN NOTE
Blood pressure reasonably well-controlled, continue current regimen  Orders:    CBC and Auto Differential; Future    Comprehensive metabolic panel; Future

## 2025-05-12 NOTE — PROGRESS NOTES
Subjective   Reason for Visit: Norris Espinal is an 64 y.o. male here for a Medicare Wellness visit.     Past Medical, Surgical, and Family History reviewed and updated in chart.    Reviewed all medications by prescribing practitioner or clinical pharmacist (such as prescriptions, OTCs, herbal therapies and supplements) and documented in the medical record.  Medicare Wellness Billing Compliance Satisfied    *This is a visual tool to show completion of required items on the day of the visit. Green checks will only appear on the date of visit.    Review all medications by prescribing practitioner or clinical pharmacist (such as prescriptions, OTCs, herbal therapies and supplements) documented in the medical record    Past Medical, Surgical, and Family History reviewed and updated in chart    Tobacco Use Reviewed    Alcohol Use Reviewed    Illicit Drug Use Reviewed    PHQ2/9    Falls in Last Year Reviewed    Home Safety Risk Factors Reviewed    Cognitive Impairment Reviewed    Patient Self Assessment and Health Status    Current Diet Reviewed    Exercise Frequency    ADL - Hearing Impairment    ADL - Bathing    ADL - Dressing    ADL - Walks in Home    IADL - Managing Finances    IADL - Grocery Shopping    IADL - Taking Medications    IADL - Doing Housework      HPI  Nutrition: increased meals out  over winter while in [place].  More frequent meals prepared at home now that back in [place].  Daily alcohol consumption, typically at least 3 drinks daily.  Exercise: walking; occasional weights  Sleep: Okay sleep onset  Eye: due  Dental: due  Colon due 9/2027    HTN-denies headaches, lightheadedness, dizziness.  No recent home BP measurements  Hyperlipidemia-stable on rosuvastatin.  Tolerates without myalgia or GI side effects.  Hypothyroidism-compliant with daily dosing of levothyroxine.  No unexplained weight changes, bowel changes, edema.  Atrial fibrillation-continues to follow with cardiology.   "Stable on Xarelto for anticoagulation and rate controlled on metoprolol.  Patient Care Team:  Court Diallo MD as PCP - General  Court Diallo MD as PCP - [facility] ACO Attributed Provider  Eliu Larios MD as Cardiologist (Cardiology)  Mao Zhou MD as Referring Physician (Gastroenterology)  Emanuel Gutierrez MD as Cardiologist (Electrophysiology)  BHARATI Nolasco-CNP as Nurse Practitioner (Cardiology)     Review of Systems  Per HPI  Objective   Vitals:  /87 (BP Location: Left arm, Patient Position: Sitting)   Pulse 71   Temp 36.3 °C (97.3 °F)   Ht 1.727 m (5' 8\")   Wt 86.6 kg (191 lb)   BMI 29.04 kg/m²       Physical Exam  Vitals reviewed.   Constitutional:       General: He is not in acute distress.     Appearance: Normal appearance. He is normal weight. He is not ill-appearing.   HENT:      Head: Normocephalic and atraumatic.      Right Ear: Tympanic membrane and ear canal normal.      Left Ear: Tympanic membrane and ear canal normal.      Nose: No congestion or rhinorrhea.      Mouth/Throat:      Mouth: Mucous membranes are moist.      Pharynx: Oropharynx is clear.   Eyes:      Extraocular Movements: Extraocular movements intact.      Conjunctiva/sclera: Conjunctivae normal.      Pupils: Pupils are equal, round, and reactive to light.   Cardiovascular:      Rate and Rhythm: Normal rate. Rhythm regularly irregular.      Pulses: Normal pulses.      Heart sounds: Normal heart sounds. No murmur heard.  Pulmonary:      Effort: Pulmonary effort is normal. No respiratory distress.      Breath sounds: Normal breath sounds.   Abdominal:      General: Abdomen is flat. Bowel sounds are normal.      Palpations: Abdomen is soft.      Tenderness: There is no abdominal tenderness.   Musculoskeletal:         General: Normal range of motion.      Cervical back: Normal range of motion and neck supple.      Right lower leg: No edema.      Left lower leg: No edema.   Lymphadenopathy:      " Cervical: No cervical adenopathy.   Skin:     General: Skin is warm and dry.   Neurological:      General: No focal deficit present.      Mental Status: He is alert and oriented to person, place, and time.   Psychiatric:         Mood and Affect: Mood normal.         Thought Content: Thought content normal.       Lab Results   Component Value Date    WBC 6.0 05/09/2025    HGB 14.1 05/09/2025    HCT 41.4 05/09/2025     05/09/2025    CHOL 206 (H) 11/13/2024    TRIG 97 11/13/2024    HDL 65.5 11/13/2024    ALT 30 05/09/2025    AST 37 (H) 05/09/2025     05/09/2025    K 4.5 05/09/2025     05/09/2025    CREATININE 1.01 05/09/2025    BUN 29 (H) 05/09/2025    CO2 27 05/09/2025    TSH 3.25 05/09/2025    PSA 0.70 05/09/2025    INR 1.1 09/23/2024    HGBA1C 5.2 05/09/2025     par    Assessment & Plan  Routine general medical examination at health care facility  Encouraged healthy food choices, trying to eat most meals prepared at home, reducing processed and prepackaged foods.  Continue with regular physical activity and incorporation of strength and resistance training  Reviewed recommended vaccines.  Orders provided and encouraged flu shot and COVID booster in the fall.  Orders:    1 Year Follow Up In Primary Care - Wellness Exam; Future    Screening for eye condition  He reports no recent eye exam.  Recommend screening eye exam  Orders:    Referral to Ophthalmology; Future    Need for vaccination    Orders:    Flu vaccine, high dose; Future    respiratory syncytial virus, RSV, vaccine, adjuvanted, age 60y+ (Arexvy) 120 mcg/0.5 mL suspension for reconstitution; Inject 0.5 mL into the muscle 1 time for 1 dose.    Essential hypertension, benign  Blood pressure reasonably well-controlled, continue current regimen  Orders:    CBC and Auto Differential; Future    Comprehensive metabolic panel; Future    Macrocytosis  With follow-up lab work, check B12 and folic acid to further evaluate macrocytosis, but suspect  related to consistent daily alcohol intake.  Will also check thiamine.  Orders:    CBC and Auto Differential; Future    Vitamin B12; Future    Folate; Future    Vitamin B1, Whole Blood; Future    Mixed hyperlipidemia  Continue rosuvastatin.  Proceed with lipid panel as previously ordered by his cardiologist.  We discussed potentially may need additional agent such as ezetimibe if LDL remains above goal control.  Orders:    Comprehensive metabolic panel; Future    Elevated LFTs  Recommend reduction of alcohol intake  Orders:    Comprehensive metabolic panel; Future    Hypothyroidism, unspecified type  Continue levothyroxine, adjust dose if needed based on follow-up TSH.  Orders:    TSH with reflex to Free T4 if abnormal; Future    levothyroxine (Synthroid, Levoxyl) 25 mcg tablet; Take 1 tablet (25 mcg) by mouth once daily.

## 2025-05-12 NOTE — ASSESSMENT & PLAN NOTE
With follow-up lab work, check B12 and folic acid to further evaluate macrocytosis, but suspect related to consistent daily alcohol intake.  Will also check thiamine.  Orders:    CBC and Auto Differential; Future    Vitamin B12; Future    Folate; Future    Vitamin B1, Whole Blood; Future

## 2025-06-13 NOTE — TELEPHONE ENCOUNTER
Patient left a message requesting refill of Xarelto 20 mg oral tablet.  In review of prior EMR, Dr. Diallo advised to hold Xarelto until cleared by pulmonolgy.    I called a left a message requesting return call so that I can obtain additional details.   [Home] : at home, [unfilled] , at the time of the visit. [Medical Office: ([facility])___] : at the medical office located in  [Telehealth (audio & video)] : This visit was provided via telehealth using real-time 2-way audio visual technology. [Verbal consent obtained from patient] : the patient, [unfilled] [Follow-Up] : a follow-up evaluation of

## 2025-06-25 DIAGNOSIS — I48.91 UNSPECIFIED ATRIAL FIBRILLATION (MULTI): ICD-10-CM

## 2025-06-25 DIAGNOSIS — I48.91 ATRIAL FIBRILLATION, UNSPECIFIED TYPE (MULTI): ICD-10-CM

## 2025-06-26 RX ORDER — METOPROLOL SUCCINATE 25 MG/1
25 TABLET, EXTENDED RELEASE ORAL DAILY
Qty: 90 TABLET | Refills: 1 | Status: SHIPPED | OUTPATIENT
Start: 2025-06-26 | End: 2025-12-23

## 2025-07-28 ENCOUNTER — TELEPHONE (OUTPATIENT)
Dept: PRIMARY CARE | Facility: CLINIC | Age: 65
End: 2025-07-28
Payer: MEDICARE

## 2025-07-28 DIAGNOSIS — I10 ESSENTIAL HYPERTENSION, BENIGN: Primary | ICD-10-CM

## 2025-07-28 NOTE — TELEPHONE ENCOUNTER
Rx Refill Request Telephone Encounter    Name:  Norris Espinal  :  [id number]  Medication Name:      levothyroxine (Synthroid, Levoxyl) 25 mcg tablet         chlorthalidone (Hygroton) 25 mg tablet   (I dont see on his current meds)        Specific Pharmacy location:    [facility][id number] [address] AT [facility]     Date of last appointment:  25  Date of next appointment:  25

## 2025-07-30 RX ORDER — CHLORTHALIDONE 25 MG/1
25 TABLET ORAL DAILY
Qty: 90 TABLET | Refills: 0 | Status: SHIPPED | OUTPATIENT
Start: 2025-07-30 | End: 2025-10-28

## 2025-09-18 ENCOUNTER — APPOINTMENT (OUTPATIENT)
Dept: CARDIOLOGY | Facility: CLINIC | Age: 65
End: 2025-09-18
Payer: MEDICARE

## 2025-11-12 ENCOUNTER — APPOINTMENT (OUTPATIENT)
Dept: PRIMARY CARE | Facility: CLINIC | Age: 65
End: 2025-11-12
Payer: MEDICARE

## (undated) DEVICE — PATCHES, EXTERNAL REFERENCE, CARTO3

## (undated) DEVICE — SHEATH, PINNACLE, W/.038 GUIDEWIRE, 10 CM,  8FR INTRODUCER, 8FR DIA, 2.5 CM DIALATOR

## (undated) DEVICE — CATHETER, THERMOCOOL SMART TOUCH, SF, D-F CURVE

## (undated) DEVICE — NEEDLE, TRANSSEPTAL, BRK-1, 18G X 98CM, 50DEG BEVEL

## (undated) DEVICE — TUBING SET, IRRIGATION, SMARTABLATE

## (undated) DEVICE — CATHETHER, CS, BI-DIRECTIONAL, 7FR X 115CM, F-J CURVE, 2MM TIP, 2-8-2 SPACING

## (undated) DEVICE — CATHETER, ELECTROPHYSIOLOGY, SUPREME QUAD, CRD-2 5 MM, 5 FR

## (undated) DEVICE — GUIDEWIRE KIT, SAFESEPT TRANSSEPTAL, .014 X 135CM

## (undated) DEVICE — SHEATH, GUIDING, VIZIGO, 8.5F WITH CURVE VIZ  MDC

## (undated) DEVICE — CATHETER, PENTARAY, NAV ECO, 7FR, 2-6-2 SPACING, D CURVE

## (undated) DEVICE — SHEATH, PINNACLE, W/.038 GUIDEWIRE, 10 CM,  9FR INTRODUCER, 9FR DIA, 2.5 CM DIALATOR

## (undated) DEVICE — CATHETER, ULTRASOUND, SOUNDSTAR, 8F X 90CM

## (undated) DEVICE — DEVICE, CLOSURE, PERCLOSE, PROSTYLE